# Patient Record
Sex: MALE | Race: WHITE | NOT HISPANIC OR LATINO | Employment: FULL TIME | URBAN - METROPOLITAN AREA
[De-identification: names, ages, dates, MRNs, and addresses within clinical notes are randomized per-mention and may not be internally consistent; named-entity substitution may affect disease eponyms.]

---

## 2018-01-04 ENCOUNTER — OFFICE VISIT - HEALTHEAST (OUTPATIENT)
Dept: FAMILY MEDICINE | Facility: CLINIC | Age: 23
End: 2018-01-04

## 2018-01-04 DIAGNOSIS — I86.1 BILATERAL VARICOCELES: ICD-10-CM

## 2018-01-04 ASSESSMENT — MIFFLIN-ST. JEOR: SCORE: 2467.71

## 2018-01-05 ENCOUNTER — COMMUNICATION - HEALTHEAST (OUTPATIENT)
Dept: FAMILY MEDICINE | Facility: CLINIC | Age: 23
End: 2018-01-05

## 2018-01-17 ENCOUNTER — RECORDS - HEALTHEAST (OUTPATIENT)
Dept: ADMINISTRATIVE | Facility: OTHER | Age: 23
End: 2018-01-17

## 2018-02-05 ENCOUNTER — RECORDS - HEALTHEAST (OUTPATIENT)
Dept: ADMINISTRATIVE | Facility: OTHER | Age: 23
End: 2018-02-05

## 2018-03-07 ENCOUNTER — COMMUNICATION - HEALTHEAST (OUTPATIENT)
Dept: FAMILY MEDICINE | Facility: CLINIC | Age: 23
End: 2018-03-07

## 2018-03-09 ENCOUNTER — OFFICE VISIT - HEALTHEAST (OUTPATIENT)
Dept: FAMILY MEDICINE | Facility: CLINIC | Age: 23
End: 2018-03-09

## 2018-03-09 DIAGNOSIS — Z00.00 HEALTHCARE MAINTENANCE: ICD-10-CM

## 2018-03-09 DIAGNOSIS — Z72.0 TOBACCO ABUSE: ICD-10-CM

## 2018-03-09 DIAGNOSIS — I86.1 LEFT VARICOCELE: ICD-10-CM

## 2018-03-09 DIAGNOSIS — I49.9 IRREGULAR HEART BEAT: ICD-10-CM

## 2018-03-09 DIAGNOSIS — M41.129 ADOLESCENT IDIOPATHIC SCOLIOSIS, UNSPECIFIED SPINAL REGION: ICD-10-CM

## 2018-03-09 DIAGNOSIS — Z71.6 TOBACCO ABUSE COUNSELING: ICD-10-CM

## 2018-03-09 DIAGNOSIS — Z86.0100 HX OF COLONIC POLYPS: ICD-10-CM

## 2018-03-09 LAB
ALBUMIN SERPL-MCNC: 4.3 G/DL (ref 3.5–5)
ALP SERPL-CCNC: 98 U/L (ref 45–120)
ALT SERPL W P-5'-P-CCNC: 81 U/L (ref 0–45)
ANION GAP SERPL CALCULATED.3IONS-SCNC: 11 MMOL/L (ref 5–18)
AST SERPL W P-5'-P-CCNC: 29 U/L (ref 0–40)
BILIRUB SERPL-MCNC: 0.5 MG/DL (ref 0–1)
BUN SERPL-MCNC: 17 MG/DL (ref 8–22)
CALCIUM SERPL-MCNC: 10.1 MG/DL (ref 8.5–10.5)
CHLORIDE BLD-SCNC: 106 MMOL/L (ref 98–107)
CHOLEST SERPL-MCNC: 215 MG/DL
CO2 SERPL-SCNC: 27 MMOL/L (ref 22–31)
CREAT SERPL-MCNC: 0.88 MG/DL (ref 0.7–1.3)
FASTING STATUS PATIENT QL REPORTED: NO
GFR SERPL CREATININE-BSD FRML MDRD: >60 ML/MIN/1.73M2
GLUCOSE BLD-MCNC: 78 MG/DL (ref 70–125)
HDLC SERPL-MCNC: 42 MG/DL
LDLC SERPL CALC-MCNC: 133 MG/DL
POTASSIUM BLD-SCNC: 4.5 MMOL/L (ref 3.5–5)
PROT SERPL-MCNC: 7.8 G/DL (ref 6–8)
SODIUM SERPL-SCNC: 144 MMOL/L (ref 136–145)
TRIGL SERPL-MCNC: 199 MG/DL

## 2018-03-09 ASSESSMENT — MIFFLIN-ST. JEOR: SCORE: 2495.49

## 2018-03-11 ENCOUNTER — COMMUNICATION - HEALTHEAST (OUTPATIENT)
Dept: FAMILY MEDICINE | Facility: CLINIC | Age: 23
End: 2018-03-11

## 2018-03-22 ENCOUNTER — COMMUNICATION - HEALTHEAST (OUTPATIENT)
Dept: FAMILY MEDICINE | Facility: CLINIC | Age: 23
End: 2018-03-22

## 2018-05-14 ENCOUNTER — OFFICE VISIT - HEALTHEAST (OUTPATIENT)
Dept: FAMILY MEDICINE | Facility: CLINIC | Age: 23
End: 2018-05-14

## 2018-05-14 DIAGNOSIS — J06.9 URI (UPPER RESPIRATORY INFECTION): ICD-10-CM

## 2018-05-14 DIAGNOSIS — A08.4 VIRAL GASTROENTERITIS: ICD-10-CM

## 2018-05-14 ASSESSMENT — MIFFLIN-ST. JEOR: SCORE: 2527.24

## 2018-05-16 ENCOUNTER — COMMUNICATION - HEALTHEAST (OUTPATIENT)
Dept: FAMILY MEDICINE | Facility: CLINIC | Age: 23
End: 2018-05-16

## 2018-08-24 ENCOUNTER — AMBULATORY - HEALTHEAST (OUTPATIENT)
Dept: NURSING | Facility: CLINIC | Age: 23
End: 2018-08-24

## 2018-08-24 ENCOUNTER — COMMUNICATION - HEALTHEAST (OUTPATIENT)
Dept: FAMILY MEDICINE | Facility: CLINIC | Age: 23
End: 2018-08-24

## 2018-08-24 DIAGNOSIS — Z00.00 ROUTINE GENERAL MEDICAL EXAMINATION AT A HEALTH CARE FACILITY: ICD-10-CM

## 2018-09-10 ENCOUNTER — COMMUNICATION - HEALTHEAST (OUTPATIENT)
Dept: FAMILY MEDICINE | Facility: CLINIC | Age: 23
End: 2018-09-10

## 2018-09-20 ENCOUNTER — AMBULATORY - HEALTHEAST (OUTPATIENT)
Dept: NURSING | Facility: CLINIC | Age: 23
End: 2018-09-20

## 2018-09-20 ENCOUNTER — AMBULATORY - HEALTHEAST (OUTPATIENT)
Dept: FAMILY MEDICINE | Facility: CLINIC | Age: 23
End: 2018-09-20

## 2018-09-20 DIAGNOSIS — Z00.00 HEALTHCARE MAINTENANCE: ICD-10-CM

## 2018-11-08 ENCOUNTER — OFFICE VISIT - HEALTHEAST (OUTPATIENT)
Dept: FAMILY MEDICINE | Facility: CLINIC | Age: 23
End: 2018-11-08

## 2018-11-08 DIAGNOSIS — R03.0 ELEVATED BP WITHOUT DIAGNOSIS OF HYPERTENSION: ICD-10-CM

## 2018-11-08 DIAGNOSIS — Z78.9 USE OF ENERGY DRINKS: ICD-10-CM

## 2018-11-08 LAB
ALBUMIN SERPL-MCNC: 4.1 G/DL (ref 3.5–5)
ALP SERPL-CCNC: 94 U/L (ref 45–120)
ALT SERPL W P-5'-P-CCNC: 52 U/L (ref 0–45)
ANION GAP SERPL CALCULATED.3IONS-SCNC: 12 MMOL/L (ref 5–18)
AST SERPL W P-5'-P-CCNC: 27 U/L (ref 0–40)
ATRIAL RATE - MUSE: 89 BPM
BILIRUB SERPL-MCNC: 0.3 MG/DL (ref 0–1)
BUN SERPL-MCNC: 12 MG/DL (ref 8–22)
CALCIUM SERPL-MCNC: 10.1 MG/DL (ref 8.5–10.5)
CHLORIDE BLD-SCNC: 107 MMOL/L (ref 98–107)
CO2 SERPL-SCNC: 24 MMOL/L (ref 22–31)
CREAT SERPL-MCNC: 0.85 MG/DL (ref 0.7–1.3)
DIASTOLIC BLOOD PRESSURE - MUSE: NORMAL MMHG
GFR SERPL CREATININE-BSD FRML MDRD: >60 ML/MIN/1.73M2
GLUCOSE BLD-MCNC: 77 MG/DL (ref 70–125)
HBA1C MFR BLD: 5.3 % (ref 3.5–6)
INTERPRETATION ECG - MUSE: NORMAL
P AXIS - MUSE: 14 DEGREES
POTASSIUM BLD-SCNC: 4.3 MMOL/L (ref 3.5–5)
PR INTERVAL - MUSE: 138 MS
PROT SERPL-MCNC: 7.3 G/DL (ref 6–8)
QRS DURATION - MUSE: 108 MS
QT - MUSE: 364 MS
QTC - MUSE: 442 MS
R AXIS - MUSE: 43 DEGREES
SODIUM SERPL-SCNC: 143 MMOL/L (ref 136–145)
SYSTOLIC BLOOD PRESSURE - MUSE: NORMAL MMHG
T AXIS - MUSE: 35 DEGREES
VENTRICULAR RATE- MUSE: 89 BPM

## 2019-01-07 ENCOUNTER — OFFICE VISIT - HEALTHEAST (OUTPATIENT)
Dept: FAMILY MEDICINE | Facility: CLINIC | Age: 24
End: 2019-01-07

## 2019-01-07 ENCOUNTER — COMMUNICATION - HEALTHEAST (OUTPATIENT)
Dept: FAMILY MEDICINE | Facility: CLINIC | Age: 24
End: 2019-01-07

## 2019-01-07 DIAGNOSIS — A08.4 VIRAL GASTROENTERITIS: ICD-10-CM

## 2019-01-07 DIAGNOSIS — Z00.00 HEALTHCARE MAINTENANCE: ICD-10-CM

## 2019-01-07 ASSESSMENT — MIFFLIN-ST. JEOR: SCORE: 2644.61

## 2019-01-08 ENCOUNTER — COMMUNICATION - HEALTHEAST (OUTPATIENT)
Dept: FAMILY MEDICINE | Facility: CLINIC | Age: 24
End: 2019-01-08

## 2019-01-09 ENCOUNTER — COMMUNICATION - HEALTHEAST (OUTPATIENT)
Dept: FAMILY MEDICINE | Facility: CLINIC | Age: 24
End: 2019-01-09

## 2019-01-21 ENCOUNTER — OFFICE VISIT - HEALTHEAST (OUTPATIENT)
Dept: FAMILY MEDICINE | Facility: CLINIC | Age: 24
End: 2019-01-21

## 2019-01-21 DIAGNOSIS — J02.9 EXUDATIVE PHARYNGITIS: ICD-10-CM

## 2019-01-21 DIAGNOSIS — R07.0 THROAT PAIN: ICD-10-CM

## 2019-01-21 LAB — DEPRECATED S PYO AG THROAT QL EIA: NORMAL

## 2019-01-22 LAB — GROUP A STREP BY PCR: NORMAL

## 2019-01-23 ENCOUNTER — COMMUNICATION - HEALTHEAST (OUTPATIENT)
Dept: FAMILY MEDICINE | Facility: CLINIC | Age: 24
End: 2019-01-23

## 2019-03-01 ENCOUNTER — COMMUNICATION - HEALTHEAST (OUTPATIENT)
Dept: FAMILY MEDICINE | Facility: CLINIC | Age: 24
End: 2019-03-01

## 2019-03-06 ENCOUNTER — COMMUNICATION - HEALTHEAST (OUTPATIENT)
Dept: FAMILY MEDICINE | Facility: CLINIC | Age: 24
End: 2019-03-06

## 2019-03-07 ENCOUNTER — AMBULATORY - HEALTHEAST (OUTPATIENT)
Dept: FAMILY MEDICINE | Facility: CLINIC | Age: 24
End: 2019-03-07

## 2019-03-07 DIAGNOSIS — Z71.6 TOBACCO ABUSE COUNSELING: ICD-10-CM

## 2019-03-21 ENCOUNTER — RECORDS - HEALTHEAST (OUTPATIENT)
Dept: ADMINISTRATIVE | Facility: OTHER | Age: 24
End: 2019-03-21

## 2019-04-25 ENCOUNTER — OFFICE VISIT - HEALTHEAST (OUTPATIENT)
Dept: FAMILY MEDICINE | Facility: CLINIC | Age: 24
End: 2019-04-25

## 2019-04-25 ENCOUNTER — COMMUNICATION - HEALTHEAST (OUTPATIENT)
Dept: FAMILY MEDICINE | Facility: CLINIC | Age: 24
End: 2019-04-25

## 2019-04-25 DIAGNOSIS — B37.2 CANDIDAL SKIN INFECTION: ICD-10-CM

## 2019-07-08 ENCOUNTER — COMMUNICATION - HEALTHEAST (OUTPATIENT)
Dept: FAMILY MEDICINE | Facility: CLINIC | Age: 24
End: 2019-07-08

## 2019-08-27 ENCOUNTER — COMMUNICATION - HEALTHEAST (OUTPATIENT)
Dept: FAMILY MEDICINE | Facility: CLINIC | Age: 24
End: 2019-08-27

## 2019-09-05 ENCOUNTER — AMBULATORY - HEALTHEAST (OUTPATIENT)
Dept: NURSING | Facility: CLINIC | Age: 24
End: 2019-09-05

## 2019-09-05 DIAGNOSIS — Z00.00 HEALTHCARE MAINTENANCE: ICD-10-CM

## 2019-10-11 ENCOUNTER — COMMUNICATION - HEALTHEAST (OUTPATIENT)
Dept: FAMILY MEDICINE | Facility: CLINIC | Age: 24
End: 2019-10-11

## 2019-10-11 DIAGNOSIS — Z86.0100 HX OF COLONIC POLYPS: ICD-10-CM

## 2019-10-16 ENCOUNTER — COMMUNICATION - HEALTHEAST (OUTPATIENT)
Dept: FAMILY MEDICINE | Facility: CLINIC | Age: 24
End: 2019-10-16

## 2019-12-20 ENCOUNTER — RECORDS - HEALTHEAST (OUTPATIENT)
Dept: ADMINISTRATIVE | Facility: OTHER | Age: 24
End: 2019-12-20

## 2020-01-02 ENCOUNTER — RECORDS - HEALTHEAST (OUTPATIENT)
Dept: ADMINISTRATIVE | Facility: OTHER | Age: 25
End: 2020-01-02

## 2020-02-13 ENCOUNTER — COMMUNICATION - HEALTHEAST (OUTPATIENT)
Dept: FAMILY MEDICINE | Facility: CLINIC | Age: 25
End: 2020-02-13

## 2020-02-21 ENCOUNTER — OFFICE VISIT - HEALTHEAST (OUTPATIENT)
Dept: FAMILY MEDICINE | Facility: CLINIC | Age: 25
End: 2020-02-21

## 2020-02-21 DIAGNOSIS — M41.129 ADOLESCENT IDIOPATHIC SCOLIOSIS, UNSPECIFIED SPINAL REGION: ICD-10-CM

## 2020-02-21 DIAGNOSIS — G89.29 CHRONIC LOW BACK PAIN WITH SCIATICA, SCIATICA LATERALITY UNSPECIFIED, UNSPECIFIED BACK PAIN LATERALITY: ICD-10-CM

## 2020-02-21 DIAGNOSIS — M54.40 CHRONIC LOW BACK PAIN WITH SCIATICA, SCIATICA LATERALITY UNSPECIFIED, UNSPECIFIED BACK PAIN LATERALITY: ICD-10-CM

## 2020-02-21 RX ORDER — IBUPROFEN 200 MG
200 TABLET ORAL
Status: SHIPPED | COMMUNITY
Start: 2012-06-21 | End: 2023-06-23

## 2020-02-25 ENCOUNTER — OFFICE VISIT - HEALTHEAST (OUTPATIENT)
Dept: FAMILY MEDICINE | Facility: CLINIC | Age: 25
End: 2020-02-25

## 2020-02-25 ENCOUNTER — AMBULATORY - HEALTHEAST (OUTPATIENT)
Dept: FAMILY MEDICINE | Facility: CLINIC | Age: 25
End: 2020-02-25

## 2020-02-25 ENCOUNTER — COMMUNICATION - HEALTHEAST (OUTPATIENT)
Dept: FAMILY MEDICINE | Facility: CLINIC | Age: 25
End: 2020-02-25

## 2020-02-25 DIAGNOSIS — R07.9 ACUTE CHEST PAIN: ICD-10-CM

## 2020-02-25 DIAGNOSIS — M54.2 NECK PAIN: ICD-10-CM
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
## 2020-02-25 LAB
ATRIAL RATE - MUSE: 88 BPM
DIASTOLIC BLOOD PRESSURE - MUSE: NORMAL
INTERPRETATION ECG - MUSE: NORMAL
P AXIS - MUSE: 49 DEGREES
PR INTERVAL - MUSE: 126 MS
QRS DURATION - MUSE: 108 MS
QT - MUSE: 362 MS
QTC - MUSE: 438 MS
R AXIS - MUSE: 37 DEGREES
SYSTOLIC BLOOD PRESSURE - MUSE: NORMAL
T AXIS - MUSE: 29 DEGREES
VENTRICULAR RATE- MUSE: 88 BPM

## 2020-03-11 ENCOUNTER — COMMUNICATION - HEALTHEAST (OUTPATIENT)
Dept: FAMILY MEDICINE | Facility: CLINIC | Age: 25
End: 2020-03-11

## 2020-03-12 ENCOUNTER — OFFICE VISIT - HEALTHEAST (OUTPATIENT)
Dept: FAMILY MEDICINE | Facility: CLINIC | Age: 25
End: 2020-03-12

## 2020-03-12 ENCOUNTER — RECORDS - HEALTHEAST (OUTPATIENT)
Dept: GENERAL RADIOLOGY | Facility: CLINIC | Age: 25
End: 2020-03-12

## 2020-03-12 DIAGNOSIS — M79.602 PAIN IN LEFT ARM: ICD-10-CM

## 2020-03-12 DIAGNOSIS — M41.129 ADOLESCENT IDIOPATHIC SCOLIOSIS, UNSPECIFIED SPINAL REGION: ICD-10-CM

## 2020-03-12 DIAGNOSIS — M54.2 NECK PAIN: ICD-10-CM

## 2020-03-12 DIAGNOSIS — M79.601 PARESTHESIA AND PAIN OF BOTH UPPER EXTREMITIES: ICD-10-CM

## 2020-03-12 DIAGNOSIS — R20.2 PARESTHESIA OF SKIN: ICD-10-CM

## 2020-03-12 DIAGNOSIS — R20.2 PARESTHESIA AND PAIN OF BOTH UPPER EXTREMITIES: ICD-10-CM

## 2020-03-12 DIAGNOSIS — M79.602 PARESTHESIA AND PAIN OF BOTH UPPER EXTREMITIES: ICD-10-CM

## 2020-03-12 DIAGNOSIS — R29.898 OTHER SYMPTOMS AND SIGNS INVOLVING THE MUSCULOSKELETAL SYSTEM: ICD-10-CM

## 2020-03-12 DIAGNOSIS — M79.601 PAIN IN RIGHT ARM: ICD-10-CM

## 2020-03-12 DIAGNOSIS — R29.898 UPPER EXTREMITY WEAKNESS: ICD-10-CM

## 2020-03-12 DIAGNOSIS — M54.2 CERVICALGIA: ICD-10-CM

## 2020-03-20 ENCOUNTER — HOSPITAL ENCOUNTER (OUTPATIENT)
Dept: MRI IMAGING | Facility: HOSPITAL | Age: 25
Discharge: HOME | End: 2020-03-20
Attending: FAMILY MEDICINE

## 2020-03-20 DIAGNOSIS — M79.602 PARESTHESIA AND PAIN OF BOTH UPPER EXTREMITIES: ICD-10-CM

## 2020-03-20 DIAGNOSIS — M79.601 PARESTHESIA AND PAIN OF BOTH UPPER EXTREMITIES: ICD-10-CM

## 2020-03-20 DIAGNOSIS — M54.2 NECK PAIN: ICD-10-CM

## 2020-03-20 DIAGNOSIS — R29.898 UPPER EXTREMITY WEAKNESS: ICD-10-CM

## 2020-03-20 DIAGNOSIS — R20.2 PARESTHESIA AND PAIN OF BOTH UPPER EXTREMITIES: ICD-10-CM

## 2020-04-04 ENCOUNTER — COMMUNICATION - HEALTHEAST (OUTPATIENT)
Dept: FAMILY MEDICINE | Facility: CLINIC | Age: 25
End: 2020-04-04

## 2020-04-06 ENCOUNTER — AMBULATORY - HEALTHEAST (OUTPATIENT)
Dept: FAMILY MEDICINE | Facility: CLINIC | Age: 25
End: 2020-04-06

## 2020-04-06 DIAGNOSIS — L73.1 INGROWN HAIR: ICD-10-CM

## 2020-04-20 ENCOUNTER — VIRTUAL VISIT (OUTPATIENT)
Dept: FAMILY MEDICINE | Facility: OTHER | Age: 25
End: 2020-04-20

## 2020-04-20 ENCOUNTER — COMMUNICATION - HEALTHEAST (OUTPATIENT)
Dept: FAMILY MEDICINE | Facility: CLINIC | Age: 25
End: 2020-04-20

## 2020-04-20 NOTE — PROGRESS NOTES
"Date: 2020 10:36:08  Clinician: Stanley Saldana  Clinician NPI: [id number]  Patient: Tyler lilly  Patient : 1995  Patient Address: [address] [place], [place] [address]  Patient Phone: [phone]  Visit Protocol: URI  Patient Summary:  Tyler is a 25 year old ( : 1995 ) male who initiated a Visit for cold, sinus infection, or influenza. When asked the question \"Please sign me up to receive news, health information and promotions from [employer].\", Tyler responded \"No\".    Tyler states his symptoms started today.   His symptoms consist of rhinitis, enlarged lymph nodes, chills, diarrhea, a cough, nasal congestion, malaise, myalgia, and a headache. Tyler also feels feverish but was unable to measure his temperature.   Symptom details     Nasal secretions: The color of his mucus is white and yellow.    Cough: Tyler coughs a few times an hour and his cough is not more bothersome at night. Phlegm comes into his throat when he coughs. He does not believe his cough is caused by post-nasal drip. The color of the phlegm is clear, white, and yellow.     Headache: He states the headache is moderate (4-6 on a 10 point pain scale).      Tyler denies having facial pain or pressure, sore throat, ear pain, vomiting, nausea, teeth pain, wheezing, anosmia, and ageusia. He also denies taking antibiotic medication for the symptoms, having a sinus infection within the past year, and having recent facial or sinus surgery in the past 60 days. He is not experiencing dyspnea.   Precipitating events  He has not recently been exposed to someone with influenza. Tyler has been in close contact with the following high risk individuals: children under the age of 5.   Pertinent COVID-19 (Coronavirus) information  Tyler has not traveled internationally or to the areas where COVID-19 (Coronavirus) is widespread, including cruise ship travel in the last 14 days before the start of his symptoms.   Tyler does not work or " volunteer as healthcare worker or a  and does not work or volunteer in a healthcare facility.   He does not live with a healthcare worker.   Tyler has not had a close contact with a laboratory-confirmed COVID-19 patient within 14 days of symptom onset. He also has not had a close contact with a suspected COVID-19 patient within 14 days of symptom onset.   Pertinent medical history  Tyler needs a return to work/school note.   Weight: 330 lbs   Tyler smokes or uses smokeless tobacco.   Weight: 330 lbs    MEDICATIONS: No current medications, ALLERGIES: NKDA  Clinician Response:  Dear Tyler,   Dear Tyler  Your symptoms show that you may have coronavirus (COVID-19) although the likelihood is low given your risk factors and if you have been practicing strict social distancing. This illness can cause fever, cough and trouble breathing. Many people get a mild case and get better on their own. Some people can get very sick.  Will I be tested for COVID-19?  Because the virus is spreading, we are no longer testing most patients. You may request testing if:   You are very ill. For example, you're on chemotherapy, dialysis or home hospice care. (Contact your specialty clinic or program.)   You live in a nursing home or other long-term care facility. (Talk to your nurse manager or medical director.)   You're a health care worker. (Contact your employee health office.)   How can I protect others?  Without a test, we can't know for sure that you have COVID-19. For safety, it's very important to follow these rules.  First, stay home and away from others (self-isolate) until:   You've had no fever---and no medicine that reduces fever---for 3 full days (72 hours). And...    Your other symptoms have gotten better. For example, your cough or breathing has improved. And...   At least 7 days have passed since your symptoms started.   During this time:   Don't go to work, school or anywhere else.    Stay away from  others in your home. No hugging, kissing or shaking hands.   Don't let anyone visit.   Cover your mouth and nose with a mask, tissue or wash cloth to avoid spreading germs.   Wash your hands and face often. Use soap and water.   How can I take care of myself? (please have someone else go to the store for you to  the medications)   1.Take Tylenol (acetaminophen) for fever or pain. If you have liver or kidney problems, ask your family doctor if it's okay to take Tylenol.&nbsp;  fluticasone nasal generic steroid spray once a day for next 7-10 days. use in each nostril daily, symptoms should improve with ongoing use  over the counter cough remedies such as delsym/robitussin       Adults can take either:    650 mg (two 325 mg pills) every 4 to 6 hours, or...   1,000 mg (two 500 mg pills) every 8 hours as needed.    Note: Don't take more than 3,000 mg in one day.   For children, check the Tylenol bottle for the right dose. The dose is based on the child's age or weight.   2.If you have other health problems (like cancer, heart failure, an organ transplant or severe kidney disease): Call your specialty clinic if you don't feel better in the next 2 days.       3.Know when to call 911: If your breathing is so bad that it keeps you from doing normal activities, call 911 or go to the emergency room. Tell them that you've been staying home and may have COVID-19.       4.Sign up for [employer]. We know it's scary to hear that you might have COVID-19. We want to track your symptoms to make sure you're okay over the next 2 weeks. Please look for an email from [employer]---this is a free, online program that we'll use to keep in touch. To sign up, follow the link in the email. Learn more at [email or web address].   Where can I get more information?  To learn more about COVID-19 and how to care for yourself at home, please visit the CDC website at  https://www.cdc.gov/coronavirus/2019-ncov/about/steps-when-sick.html.  For more options for care at [facility], please visit our website at [email or web address]     Diagnosis: Cough  Diagnosis ICD: R05

## 2020-06-09 ENCOUNTER — COMMUNICATION - HEALTHEAST (OUTPATIENT)
Dept: FAMILY MEDICINE | Facility: CLINIC | Age: 25
End: 2020-06-09

## 2020-06-10 ENCOUNTER — AMBULATORY - HEALTHEAST (OUTPATIENT)
Dept: FAMILY MEDICINE | Facility: CLINIC | Age: 25
End: 2020-06-10

## 2020-06-10 ENCOUNTER — OFFICE VISIT - HEALTHEAST (OUTPATIENT)
Dept: FAMILY MEDICINE | Facility: CLINIC | Age: 25
End: 2020-06-10

## 2020-06-10 ENCOUNTER — COMMUNICATION - HEALTHEAST (OUTPATIENT)
Dept: FAMILY MEDICINE | Facility: CLINIC | Age: 25
End: 2020-06-10

## 2020-06-10 DIAGNOSIS — M25.512 ACUTE PAIN OF LEFT SHOULDER: ICD-10-CM

## 2020-06-10 DIAGNOSIS — M54.2 NECK PAIN: ICD-10-CM

## 2020-06-10 DIAGNOSIS — K60.2 ANAL FISSURE: ICD-10-CM

## 2020-06-25 ENCOUNTER — COMMUNICATION - HEALTHEAST (OUTPATIENT)
Dept: FAMILY MEDICINE | Facility: CLINIC | Age: 25
End: 2020-06-25

## 2020-06-25 DIAGNOSIS — K60.2 ANAL FISSURE: ICD-10-CM

## 2020-06-26 ENCOUNTER — RECORDS - HEALTHEAST (OUTPATIENT)
Dept: ADMINISTRATIVE | Facility: OTHER | Age: 25
End: 2020-06-26

## 2020-07-07 ENCOUNTER — HOSPITAL ENCOUNTER (OUTPATIENT)
Dept: MRI IMAGING | Facility: CLINIC | Age: 25
Discharge: HOME | End: 2020-07-07
Attending: FAMILY MEDICINE

## 2020-07-07 DIAGNOSIS — M54.2 NECK PAIN: ICD-10-CM

## 2020-08-28 ENCOUNTER — COMMUNICATION - HEALTHEAST (OUTPATIENT)
Dept: FAMILY MEDICINE | Facility: CLINIC | Age: 25
End: 2020-08-28

## 2020-08-28 DIAGNOSIS — M54.40 CHRONIC LOW BACK PAIN WITH SCIATICA, SCIATICA LATERALITY UNSPECIFIED, UNSPECIFIED BACK PAIN LATERALITY: ICD-10-CM

## 2020-08-28 DIAGNOSIS — G89.29 CHRONIC LOW BACK PAIN WITH SCIATICA, SCIATICA LATERALITY UNSPECIFIED, UNSPECIFIED BACK PAIN LATERALITY: ICD-10-CM

## 2020-08-28 DIAGNOSIS — M41.129 ADOLESCENT IDIOPATHIC SCOLIOSIS, UNSPECIFIED SPINAL REGION: ICD-10-CM

## 2020-09-29 ENCOUNTER — AMBULATORY - HEALTHEAST (OUTPATIENT)
Dept: NURSING | Facility: CLINIC | Age: 25
End: 2020-09-29

## 2020-09-29 DIAGNOSIS — Z00.00 HEALTHCARE MAINTENANCE: ICD-10-CM

## 2020-10-10 ENCOUNTER — COMMUNICATION - HEALTHEAST (OUTPATIENT)
Dept: FAMILY MEDICINE | Facility: CLINIC | Age: 25
End: 2020-10-10

## 2020-10-22 ENCOUNTER — COMMUNICATION - HEALTHEAST (OUTPATIENT)
Dept: FAMILY MEDICINE | Facility: CLINIC | Age: 25
End: 2020-10-22

## 2020-12-18 ENCOUNTER — COMMUNICATION - HEALTHEAST (OUTPATIENT)
Dept: FAMILY MEDICINE | Facility: CLINIC | Age: 25
End: 2020-12-18

## 2021-01-20 ENCOUNTER — COMMUNICATION - HEALTHEAST (OUTPATIENT)
Dept: FAMILY MEDICINE | Facility: CLINIC | Age: 26
End: 2021-01-20

## 2021-01-26 ENCOUNTER — OFFICE VISIT - HEALTHEAST (OUTPATIENT)
Dept: FAMILY MEDICINE | Facility: CLINIC | Age: 26
End: 2021-01-26

## 2021-01-26 DIAGNOSIS — M25.512 CHRONIC LEFT SHOULDER PAIN: ICD-10-CM

## 2021-01-26 DIAGNOSIS — G89.29 CHRONIC LEFT SHOULDER PAIN: ICD-10-CM

## 2021-01-26 DIAGNOSIS — F52.4 PREMATURE EJACULATION: ICD-10-CM

## 2021-01-26 DIAGNOSIS — E66.01 MORBID OBESITY (H): ICD-10-CM

## 2021-02-09 ENCOUNTER — COMMUNICATION - HEALTHEAST (OUTPATIENT)
Dept: FAMILY MEDICINE | Facility: CLINIC | Age: 26
End: 2021-02-09

## 2021-02-16 ENCOUNTER — OFFICE VISIT - HEALTHEAST (OUTPATIENT)
Dept: FAMILY MEDICINE | Facility: CLINIC | Age: 26
End: 2021-02-16

## 2021-02-16 DIAGNOSIS — R05.9 COUGH: ICD-10-CM

## 2021-02-16 DIAGNOSIS — Z87.891 QUIT SMOKING WITHIN PAST YEAR: ICD-10-CM

## 2021-02-16 DIAGNOSIS — A49.01 STAPH AUREUS INFECTION: ICD-10-CM

## 2021-03-05 ENCOUNTER — COMMUNICATION - HEALTHEAST (OUTPATIENT)
Dept: FAMILY MEDICINE | Facility: CLINIC | Age: 26
End: 2021-03-05

## 2021-03-05 ENCOUNTER — AMBULATORY - HEALTHEAST (OUTPATIENT)
Dept: FAMILY MEDICINE | Facility: CLINIC | Age: 26
End: 2021-03-05

## 2021-03-05 DIAGNOSIS — L08.9 INFECTION, SKIN: ICD-10-CM

## 2021-05-10 ENCOUNTER — OFFICE VISIT - HEALTHEAST (OUTPATIENT)
Dept: FAMILY MEDICINE | Facility: CLINIC | Age: 26
End: 2021-05-10

## 2021-05-10 DIAGNOSIS — G89.29 CHRONIC LEFT SHOULDER PAIN: ICD-10-CM

## 2021-05-10 DIAGNOSIS — M25.512 CHRONIC LEFT SHOULDER PAIN: ICD-10-CM

## 2021-05-13 ENCOUNTER — RECORDS - HEALTHEAST (OUTPATIENT)
Dept: ADMINISTRATIVE | Facility: OTHER | Age: 26
End: 2021-05-13

## 2021-05-26 VITALS — SYSTOLIC BLOOD PRESSURE: 122 MMHG | DIASTOLIC BLOOD PRESSURE: 80 MMHG

## 2021-05-27 VITALS
DIASTOLIC BLOOD PRESSURE: 80 MMHG | WEIGHT: 315 LBS | HEART RATE: 88 BPM | OXYGEN SATURATION: 95 % | SYSTOLIC BLOOD PRESSURE: 110 MMHG

## 2021-05-28 NOTE — TELEPHONE ENCOUNTER
Red and irritated and itchy in the groin area.    Wife recently had a yeast infection    No open sores    No pain or burning with urination    Not painful to the touch    Home treatment recommended and advised an appointment if symptoms do not improve.    Consuelo Sheikh RN  [facility]ication Refill and Triage Nurse  4/25/2019  8:09 AM      Reason for Disposition    Mild Jock Itch in a male    Protocols used: JOCK ITCH-A-[place]

## 2021-05-28 NOTE — PROGRESS NOTES
Assessment:   The encounter diagnosis was Candidal skin infection.   Symptoms suspicious for yeast infection given wife's recent diagnosis and couple's sexual activity. Recommend treatment with topical miconazole.   Come to clinic for evaluation if symptoms do not resolve.     Plan:     Medications Ordered   Medications     miconazole (MICATIN) 2 % cream     Sig: Apply topically 2 (two) times a day. To rash on penis for 14 days or until rash resolves.     Dispense:  42.5 g     Refill:  0     Return for further follow up if needed. Call [phone]) or schedule an appointment via [employer]..    Indu Oleary MD  [facility]    Subjective:   Tyler Hughes is a 24 y.o. male who submitted an eVisit request for evaluation of redness and itchiness of penis - wife currently has vaginal yeast infection and he thinks he got it from sexual activity.    See the questionnaire and message section of encounter report for information related to history of present illness and review of systems.    The following portions of the patient's history were reviewed and updated as appropriate:  Patient Active Problem List   Diagnosis     Attention Deficit Disorder Without Hyperactivity     Obesity     Hx of colonic polyps     Left varicocele     Adolescent idiopathic scoliosis     He has No Known Allergies..     Objective:   No exam performed today, patient submitted as eVisit.

## 2021-05-31 VITALS — HEIGHT: 73 IN | BODY MASS INDEX: 41.75 KG/M2 | WEIGHT: 315 LBS

## 2021-06-01 ENCOUNTER — RECORDS - HEALTHEAST (OUTPATIENT)
Dept: ADMINISTRATIVE | Facility: OTHER | Age: 26
End: 2021-06-01

## 2021-06-01 VITALS — BODY MASS INDEX: 39.17 KG/M2 | HEIGHT: 75 IN | WEIGHT: 315 LBS

## 2021-06-02 VITALS — BODY MASS INDEX: 43.62 KG/M2 | WEIGHT: 315 LBS

## 2021-06-02 VITALS — WEIGHT: 315 LBS | HEIGHT: 75 IN | BODY MASS INDEX: 39.17 KG/M2

## 2021-06-02 NOTE — TELEPHONE ENCOUNTER
Orders being requested: colonoscopy  Reason service is needed/diagnosis: history of polyps  When are orders needed by: non-urgent  Where to send Orders: [facility]  Okay to leave detailed message?  Yes  [phone]    Patient stated Consuelo Samson MD referred him to have this done last year but he didn't get it done. Patient stated [facility] needs an order for the patient to schedule it.

## 2021-06-04 VITALS
RESPIRATION RATE: 21 BRPM | HEART RATE: 96 BPM | OXYGEN SATURATION: 96 % | DIASTOLIC BLOOD PRESSURE: 83 MMHG | SYSTOLIC BLOOD PRESSURE: 136 MMHG | BODY MASS INDEX: 41.75 KG/M2 | WEIGHT: 315 LBS | TEMPERATURE: 98.5 F

## 2021-06-04 VITALS
SYSTOLIC BLOOD PRESSURE: 132 MMHG | BODY MASS INDEX: 40.62 KG/M2 | DIASTOLIC BLOOD PRESSURE: 78 MMHG | HEART RATE: 92 BPM | WEIGHT: 315 LBS | OXYGEN SATURATION: 97 %

## 2021-06-04 VITALS
WEIGHT: 315 LBS | SYSTOLIC BLOOD PRESSURE: 124 MMHG | BODY MASS INDEX: 41.75 KG/M2 | DIASTOLIC BLOOD PRESSURE: 68 MMHG | OXYGEN SATURATION: 95 % | HEART RATE: 85 BPM

## 2021-06-05 VITALS
TEMPERATURE: 98.7 F | OXYGEN SATURATION: 95 % | DIASTOLIC BLOOD PRESSURE: 84 MMHG | HEART RATE: 97 BPM | SYSTOLIC BLOOD PRESSURE: 112 MMHG

## 2021-06-05 VITALS
WEIGHT: 315 LBS | SYSTOLIC BLOOD PRESSURE: 126 MMHG | DIASTOLIC BLOOD PRESSURE: 82 MMHG | BODY MASS INDEX: 44.5 KG/M2 | HEART RATE: 95 BPM | OXYGEN SATURATION: 95 %
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
## 2021-06-06 NOTE — PATIENT INSTRUCTIONS - HE
1. Core exercises daily  2. Body pillow for side sleeping  3. Consider PT  4. Have to cut down on the ibuprofen; up to 800 mg as needed but try for drug holidays  5. Muscle relaxer
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
## 2021-06-09 ENCOUNTER — RECORDS - HEALTHEAST (OUTPATIENT)
Dept: ADMINISTRATIVE | Facility: OTHER | Age: 26
End: 2021-06-09

## 2021-06-14 NOTE — PROGRESS NOTES
Assessment & Plan     Premature ejaculation  Patient is sometimes unable to refrain from ejaculation even when engaging in nonpartnered activities, and this is causing some significant distress to the patient but not to partner.  Patient has considered numbing agents but would be concerned about their effect on his partners experience.  Patient does not like condoms and so is less enthusiastic about the idea of numbing condoms.  We discussed sex therapy and patient was very uninterested in this idea.  With shared decision making chose to try low-dose Paxil.  - PARoxetine (PAXIL) 10 MG tablet  Dispense: 30 tablet; Refill: 2    Chronic left shoulder pain  Pt still in PT, massage therapy, and chiropractor help.   Refilled:  - cyclobenzaprine (FLEXERIL) 10 MG tablet  Dispense: 60 tablet; Refill: 1    Morbid obesity (H)  Could possibly be a factor in patient's premature ejaculation and is likely affecting patient's ongoing back pain, not addressed today.  Patient will attend weight loss eventually.             Tobacco Cessation:   reports that he has been smoking cigars. He has been smoking about 1.00 pack per day. He has quit using smokeless tobacco.    No follow-ups on file.    Consuelo Samson MD  [facility]     Tyler Hughes is 25 y.o. and presents to clinic today for the following health issues   HPI     Patient is suffering from premature ejaculation.  It has been going on for many years.  It does not seem to bother his partner but does bother the patient.  His latency between ability to climax appears to be as little as 30 minutes if he had the inclination.  He has no difficulty achieving an erection and sometimes this concern even manifests with nonpartnered activities.  Patient is not interested in the numbing gels because of a concern for its effect on his partners experience, he does not like condoms and is uninterested in therapy.    Patient also is chronic left  shoulder pain.  He has seen a physical therapist, massage therapist, and a chiropractor.  He would like a refill on his Flexeril.  He tries not to take it every day.  He does supplement with Tylenol and ibuprofen sparingly.    Review of Systems  none      Objective    /82   Pulse 95   Wt (!) 356 lb (161.5 kg)   SpO2 95%   BMI 44.50 kg/m    Body mass index is 44.5 kg/m .  Physical Exam  GEN: No apparent distress  Psych: Affect is congruent, no evidence of hallucinations or delusions

## 2021-06-15 ENCOUNTER — RECORDS - HEALTHEAST (OUTPATIENT)
Dept: ADMINISTRATIVE | Facility: OTHER | Age: 26
End: 2021-06-15

## 2021-06-15 NOTE — PATIENT INSTRUCTIONS - HE
1. Chlorhexidine shower once weekly  2. guaifenesin as needed before bed for cough  3. Picture if you get belly button infection again

## 2021-06-15 NOTE — PROGRESS NOTES
"    Assessment & Plan     Quit smoking within past year  Cough  -I suspect patient's cough is secondary to smoking cessation  -Recommend guaifenesin before bed  -Graduated patient on quitting    Staph aureus infection  -Recommended topical chlorhexidine application once weekly  -Continue to monitor, no indication for oral antibiotics at this time    No evidence of umbilical infection at this time.         Tobacco Cessation:   reports that he has quit smoking. His smoking use included cigars. He started smoking about 2 months ago. He smoked 1.00 pack per day. He has quit using smokeless tobacco.    Return in about 4 weeks (around 3/16/2021) for Annual physical.    Consuelo Samson MD  [facility]   Tyler Hughes is 25 y.o. and presents today for the following health issues   HPI   Possible umbilical infection:  -Present for 2 weeks, accompanied by fever and surrounding erythema, it was leaking pus  -Patient treated it with Epson salts  -It is now completely resolved    Smoking cessation:  - patient quit smoking 1 month ago  -He is now noticing intermittent coughing  -Believes the coughing predated his fever  -He is also coughing up \"ropes of stringy black stuff\"  -He presents a picture of this and it is clear and appears to be similar to a mucous plug    Skin lesions:  -Located across the shoulders predominantly  -Uncertain what I keep appearing        Review of Systems  none      Objective    /84   Pulse 97   Temp 98.7  F (37.1  C)   SpO2 95%   There is no height or weight on file to calculate BMI.  Physical Exam  Gen: NAD  Skin: No obvious evidence of umbilical infection, several unroofed lesions appreciated scattered across patient's shoulders              "

## 2021-06-16 PROBLEM — E66.01 MORBID OBESITY (H): Status: ACTIVE | Noted: 2021-01-26

## 2021-06-16 PROBLEM — M25.512 CHRONIC LEFT SHOULDER PAIN: Status: ACTIVE | Noted: 2021-01-26

## 2021-06-16 PROBLEM — G89.29 CHRONIC LEFT SHOULDER PAIN: Status: ACTIVE | Noted: 2021-01-26

## 2021-06-16 PROBLEM — I86.1 LEFT VARICOCELE: Status: ACTIVE | Noted: 2018-03-09

## 2021-06-16 PROBLEM — Z86.0100 HX OF COLONIC POLYPS: Status: ACTIVE | Noted: 2018-03-09

## 2021-06-16 PROBLEM — M41.129 ADOLESCENT IDIOPATHIC SCOLIOSIS: Status: ACTIVE | Noted: 2018-03-09

## 2021-06-16 NOTE — PROGRESS NOTES
Assessment/Plan:     Patient presents to clinic for establishment of care.    1. Hx of colonic polyps: Patient has a history of bleeding colonic polyps at age 15 which were removed and a colonoscopy, he was supposed to have follow-up at age 13 but never did.  - Ambulatory referral to Gastroenterology for diagnostic colonoscopy    2. Tobacco abuse counseling  Had a discussion that was greater than 15 minutes about smoking cessation and strategies to employ.  Patient is eager to start.  - varenicline (CHANTIX STARTING MONTH BOX) 0.5 mg (11)- 1 mg (42) tablet; 1 wk before you stop smoking take 0.5mg daily on days 1-3, 0.5mg 2 times each day on days 4-7, then 1mg 2 times daily.  Dispense: 53 tablet; Refill: 0  - varenicline (CHANTIX) 1 mg tablet; Take 1 tab by mouth two times a day. Take after eating with a full glass of water. NOTE: Dispense as maintenance for refills only.  Dispense: 60 tablet; Refill: 2    3. Left varicocele  Not examined today, not bothering patient, no changes    4. Adolescent idiopathic scoliosis, unspecified spinal region  Monitored by a chiropractor in [facility], last x-ray was 2 years ago, no current concerns  Had a discussion with patient about weight as the heavier the patient is the worst scoliosis may affect future back pain    5.  Irregular heart rate appreciated on examination which resolves with further examination:  -Unclear etiology, patient denies any shortness of breath or chest pain or pedal edema.  Encourage patient to monitor for symptoms, and will recheck at next appointment.  Do not believe an EKG would demonstrate significant findings as the irregularity has resolved.    Healthcare Maintenance:  - HPV immunization series initiated  - declined influenza vaccine today  USPSTF Recommendations for age 21:  - patient has been screened for intimate partner violence and there are no concerns at this time  - of the recommended screening for chlamydia, gonorrhea, syphilis, HIV,  and hepatitis patient chose no screening  - patient has been counseled on the benefits of a healthful diet, sexual health, and skin cancer prevention      Discontinued Medications:  There are no discontinued medications.    I have had an Advance Directives discussion with the patient.  The following high BMI interventions were performed this visit: weight monitoring and weight loss from baseline weight    Total time spent with patient was 45 minutes with greater than 50% spent in face-to-face counseling regarding the above plan.    This note has been dictated using voice recognition software. Any grammatical or context distortions are unintentional and inherent to the the software.     Consuelo Samson MD  [facility]      Subjective:      Tyler Hughes is a 22 y.o. male who presents to clinic for establishment of care and smoking cessation counseling.    Patient currently smokes approximately 1-1-1/2 packs daily.  He has been smoking for 11 years.  He has attempted to quit seriously only one time and he attempted it by going cold turkey and then patches.  The patches give him a burning sensation and it did not seem to work.  He finds himself feeling anxious when he does not have a cigarette and his wife is pregnant he is eager to quit.  He is interested in Chantix or the breathe program from Dr. Hamilton.    Patient also has a history of scoliosis, café au lait spot on the left forearm that is significantly large but not growing, bleeding colonic polyps and ADHD.    Old records reviewed:   ED record 1/15/18  Previous physical exams 1/22/13 and 7/1/11    Past Medical History, Family History, and Social History reviewed.   History   Smoking Status     Current Every Day Smoker     Packs/day: 1.00   Smokeless Tobacco     Former User       Review of systems is as stated in HPI.  Patient endorses: cough  The remainder of the 10 system review is otherwise negative.    Objective:     /78  Pulse 100   "Ht 6' 2.75\" (1.899 m)  Wt (!) 315 lb (142.9 kg)  SpO2 96%  BMI 39.64 kg/m2 Body mass index is 39.64 kg/(m^2).    Gen: Alert, NAD, appears stated age, normal hygiene   Eyes: conjunctivae without injection, sclera clear, EOMI  ENT/mouth: nares clear, septum midline, absent rhinorrhea, pharyngeal injection absent, neck is supple, no thyroid enlargement  CV: Originally wildly irregular heart rate, when reexamined completely normal heart rate, no murmur appreciated, pedal edema absent bilaterally  Resp: CTAB, no wheezes, rales or ronchi  ABD: normoactive, non-tender to palpation, nondistended  MSK: grossly full range of motion in all joints, no obvious deformity; slight scoliosis located in the lumbar region  Neuro: CN II-XII grossly intact, no deficits in coordination  Psych: no apparent hallucinations or delusions, no pressured speech; alert, oriented x3  SKIN: dry and large cafe au lait spot on right forearm  Heme/lymph: no pallor, no active bleeding/bruising, no adenopathy appreciated      No current outpatient prescriptions on file prior to visit.     No current facility-administered medications on file prior to visit.                "

## 2021-06-17 NOTE — PATIENT INSTRUCTIONS - HE
Patient Instructions by Marcos Montilla PA-C at 1/21/2019  1:30 PM     Author: Marcos Montilla PA-C Service: -- Author Type: Physician Assistant    Filed: 1/21/2019  1:48 PM Encounter Date: 1/21/2019 Status: Signed    : Marcos Montilla PA-C (Physician Assistant)       Suggested increased rest increased fluids and bedside humidification  Over-the-counter Tylenol for comfort.  Follow packaging directions  Over-the-counter throat lozenges with benzocaine such as Cepacol may be used if indicated and is not a choking hazard based on age.  Follow packaging directions.  Do not overuse the benzocaine as it will dry the throat and make it uncomfortable.  Noncontagious after 24 hours on the antibiotic.  Change toothbrush out after 48 hours to avoid reinfecting the mouth.  Follow-up after completion of the antibiotic if any consultation or sequela.      Self-Care for Sore Throats  Sore throats happen for many reasons, such as colds, allergies, and infections caused by viruses or bacteria. In any case, your throat becomes red and sore. Your goal for self-care is to reduce your discomfort while giving your throat a chance to heal.    Moisten and soothe your throat  Tips include the following:    Try a sip of water first thing after waking up.    Keep your throat moist by drinking 6 or more glasses of clear liquids every day.    Run a cool-air humidifier in your room overnight.    Avoid cigarette smoke.     Suck on throat lozenges, cough drops, hard candy, ice chips, or frozen fruit-juice bars. Use the sugar-free versions if your diet or medical condition requires them.  Gargle to ease irritation  Gargling every hour or 2 can ease irritation. Try gargling with 1 of these solutions:    1/4 teaspoon of salt in 1/2 cup of warm water    An over-the-counter anesthetic gargle  Use medicine for more relief  Over-the-counter medicine can reduce sore throat symptoms. Ask your pharmacist if you have questions about which medicine to  use:    Ease pain with anesthetic sprays. Aspirin or an aspirin substitute also helps. Remember, never give aspirin to anyone 18 or younger, or if you are already taking blood thinners.     For sore throats caused by allergies, try antihistamines to block the allergic reaction.    Remember: unless a sore throat is caused by a bacterial infection, antibiotics wont help you.  Prevent future sore throats  Prevention tips include the following:    Stop smoking or reduce contact with secondhand smoke. Smoke irritates the tender throat lining.    Limit contact with pets and with allergy-causing substances, such as pollen and mold.    When youre around someone with a sore throat or cold, wash your hands often to keep viruses or bacteria from spreading.    Dont strain your vocal cords.  Call your healthcare provider  Contact your healthcare provider if you have:    A temperature over 101 F (38.3 C)    White spots on the throat    Great difficulty swallowing    Trouble breathing    A skin rash    Recent exposure to someone else with strep bacteria    Severe hoarseness and swollen glands in the neck or jaw   Date Last Reviewed: 8/1/2016 2000-2016 [employer]. [address] [place] [address], [place] [address]. All rights reserved. This information is not intended as a substitute for professional medical care. Always follow your healthcare professional's instructions.
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
## 2021-06-20 ENCOUNTER — HEALTH MAINTENANCE LETTER (OUTPATIENT)
Age: 26
End: 2021-06-20
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
## 2021-06-25 ENCOUNTER — RECORDS - HEALTHEAST (OUTPATIENT)
Dept: ADMINISTRATIVE | Facility: OTHER | Age: 26
End: 2021-06-25

## 2021-06-26 NOTE — PROGRESS NOTES
"Progress Notes by Marcos Montilla PA-C at 1/4/2018  6:00 PM     Author: Marcos Montilla PA-C Service: -- Author Type: Physician Assistant    Filed: 1/5/2018 11:15 AM Encounter Date: 1/4/2018 Status: Signed    : Marcos Montilla PA-C (Physician Assistant)       Subjective:      Patient ID: Tyler Hughes is a 22 y.o. male.    Chief Complaint:    HPI  Tyler Hguhes is a 22 y.o. male who presents today complaining of a long-standing history of swelling of the left scrotum.  Patient is concerned that he wants to have it evaluated.  At this time he has no hematospermia, hematuria.  He denies a history of trauma and currently has no testicular pain.  No other symptoms of torsion.  He has no nauseousness vomiting.  No fever chills night sweats or skin rash.    No past medical history on file.    No past surgical history on file.    No family history on file.    Social History   Substance Use Topics   ? Smoking status: Current Every Day Smoker   ? Smokeless tobacco: Former User   ? Alcohol use None       Review of Systems  As above in HPI, otherwise negative.    Objective:     /68 (Patient Site: Right Arm, Patient Position: Sitting, Cuff Size: Adult Large)  Pulse 88  Temp 99  F (37.2  C) (Oral)   Resp 18  Ht 6' 1\" (1.854 m)  Wt (!) 315 lb (142.9 kg)  SpO2 98%  BMI 41.56 kg/m2    Physical Exam  General: Patient is resting comfortably no acute distress is afebrile  HEENT: Head is normocephalic atraumatic eyes are PERRLA EOMI sclera anicteric TMs are clear bilaterally  No cervical lymphadenopathy present  Lungs: Clear to auscultation bilaterally  Heart: Regular rate and rhythm  Skin: Without rash non-diaphoretic  : Normal circumcised phallus.  He has descended testicles bilaterally.  The left hemiscrotum has a palpable varicocele on the cord structure on the left side.  There is a similar smaller swelling on the right.  The testicles are without mass and nontender to palpation.    Assessment: "     Procedures    The encounter diagnosis was Bilateral varicoceles.    Plan:     1. Bilateral varicoceles  Ambulatory referral to Urology         Patient Instructions     Establish care with primary care provider for definitive evaluation and treatment and outpatient imaging with ultrasound.  If unable to get in a reasonable amount of time, or it becomes symptomatic and painful, referral to urology.        What is Varicocele?    A varicocele is a swelling in the veins above the testicles. It is similar to having varicose veins in the legs. The swelling occurs when too much blood collects in the veins because they are damaged. A varicocele most often occurs around the left testicle.  Veins in the scrotum  The scrotum is a sac of skin that covers the testicles -- the male sex organs that produce sperm and the male hormones. Blood vessels in the scrotum carry blood to and from the testicles. The vessels that carry blood away from the testicles are called veins.  When theres a problem in the veins  The veins that carry blood from the testicles extend up into the groin. That means the blood has to travel upward a long way. Valves in the veins act like simon to keep the blood from flowing back toward the testicles. In some men, these valves dont close fully. Or the muscles in the walls of the veins may be weak. Then some blood flows back into the scrotum and collects in the veins above the testicles. This makes the veins enlarge.  What are the symptoms?  A varicocele often causes no symptoms at all. Or it may cause an achy or heavy feeling in the scrotum. The pain may be worse later in the day or after standing for a long time. You may also see swollen veins just under the skin in the scrotum.  A varicocele can lower sperm count  When blood collects in the veins above the testicles, changes occur that can reduce the number and the quality of the sperm. In many cases, sperm count improves after treatment.   Date Last  Reviewed: 9/22/2014 2000-2016 [employer]. [address] [place] [address], [place] [address]. All rights reserved. This information is not intended as a substitute for professional medical care. Always follow your healthcare professional's instructions.        Treating Varicocele    In most cases, a varicocele is not serious. Your doctor may wait and watch the problem for a while. If needed, surgery or another procedure to close off the enlarged veins can be done. This may be suggested if you have pain, if the veins become unsightly, or if you and your partner are having trouble conceiving a baby.  Surgery: Open Varicocelectomy  Your health care provider may recommend surgery to tie off the enlarged veins around the testicles:    You are given anesthesia to keep you comfortable. You may or may not be asleep, depending on the medication you are given.     An incision is made in the groin or in the lower abdomen.    The veins are then cut and tied off.    The incision is closed with sutures, staples, or surgical tape.  Surgery: Laparoscopic Varicocelectomy  Instead of open surgery, laparoscopic surgery may be recommended. This is surgery done through small incisions with an instrument called a laparoscope (a thin, telescope-like device):    You are given general anesthesia to make you sleep during the procedure.    A few small openings are made in the lower abdomen. The laparoscope is inserted through 1 opening. Surgical instruments are inserted through the other small openings.    The laparoscope sends magnified pictures to a video monitor. Using these pictures, the surgeon identifies the veins that need treatment.    The veins are clamped to seal them off.    The instruments are removed. The incisions are closed with sutures, staples, or surgical tape.  Percutaneous Embolization  In place of surgery, your health care provider might recommend sealing off the enlarged veins using percutaneous embolization. A  radiologic procedure called a venogram is used to create a map of the veins. A tube is then placed in the large vein in the groin. Materials are injected through this tube into the enlarged veins to block them off.  After Your Varicocele Procedure    You may feel some pain in your testicle for a few days.    Mild swelling around the testicle is normal after the procedure. Put an ice pack (or bag of frozen peas or rice) wrapped in a thin towel on the area to help. Do this for no longer than 20 minutes at a time.    Plan to rest for 2 days to 3 days.  When to Call Your Health Care Provider  Contact your health care provider right away if you have:     Ongoing pain not relieved by pain medication.    Black-and-blue around the incision, bleeding from the incision, or swelling in the scrotum.    A fever above 100.4 F (38 C), or greenish or foul-smelling drainage from the incision.      Risks of Varicocele Repair  Risks and possible complications of these procedures include:    Hematoma (blood clot)    Infection    Fluid accumulation around testicle (hydrocele)    Injury to the nerves in the groin or scrotum    Injury to scrotal tissue or structures    Injury to the artery that supplies blood to the testicle    Risks of general anesthesia, if used    Damage to abdominal structures (laparoscopic surgery)   Date Last Reviewed: 9/21/2014 2000-2016 [employer]. [address] [place] [address], [place] [address]. All rights reserved. This information is not intended as a substitute for professional medical care. Always follow your healthcare professional's instructions.

## 2021-06-27 NOTE — PROGRESS NOTES
Progress Notes by Marcos Montilla PA-C at 2019  1:30 PM     Author: Marcos Montilla PA-C Service: -- Author Type: Physician Assistant    Filed: 2019  1:51 PM Encounter Date: 2019 Status: Signed    : Marcos Montilla PA-C (Physician Assistant)       Subjective:      Patient ID: Tyler Hughes is a 23 y.o. male.    Chief Complaint:    HPI  Tyler Hughes is a 23 y.o. male who presents today complaining of four day acute onset of sore throat and odynophagia.  Patient denies fever, chills, night sweats, fatigue, vomiting, diarrhea, skin rash, abdominal pain or urinary symptoms.  He is concerned that he has had an enlarged right tonsil and pus on the tonsil.  He is still able to swallow and handle his own secretions and has no difficulty with breathing.    No known sick contacts for strep throat.    Has not tried treatment for this over-the-counter.    Past Medical History:   Diagnosis Date   ? Colon polyps     age 14 months, 2 polyps removed for rectal bleeding       Past Surgical History:   Procedure Laterality Date   ? WISDOM TOOTH EXTRACTION         Family History   Problem Relation Age of Onset   ? Aneurysm Maternal Grandmother        Social History     Tobacco Use   ? Smoking status: Current Every Day Smoker     Packs/day: 1.00     Types: Cigars     Last attempt to quit: 3/31/2018     Years since quittin.8   ? Smokeless tobacco: Former User   Substance Use Topics   ? Alcohol use: Yes     Alcohol/week: 0.6 oz     Types: 1 Glasses of wine per week   ? Drug use: No     Comment: hx of marijuana, distant       Review of Systems  As above in HPI, otherwise balance of Review of Systems are negative.    Objective:     /70 (Patient Site: Right Arm, Patient Position: Sitting, Cuff Size: Adult Large)   Pulse 96   Temp 98.1  F (36.7  C) (Oral)   Resp 16   Wt (!) 349 lb (158.3 kg)   SpO2 97%   BMI 43.62 kg/m      Physical Exam  General: Patient is resting comfortably no acute distress is  afebrile  HEENT: Head is normocephalic atraumatic   eyes are PERRL EOMI sclera anicteric   TMs are clear bilaterally  Throat is with mild pharyngeal wall erythema and no exudate  No cervical lymphadenopathy present  LUNGS: Clear to auscultation bilaterally  HEART: Regular rate and rhythm  Skin: Without rash non-diaphoretic    Lab:  Recent Results (from the past 24 hour(s))   Rapid Strep A Screen-Throat swab   Result Value Ref Range    Rapid Strep A Antigen No Group A Strep detected, presumptive negative No Group A Strep detected, presumptive negative       Assessment:     Procedures    1. Exudative pharyngitis     2. Throat pain  Rapid Strep A Screen-Throat swab    Group A Strep, RNA Direct Detection, Throat       Plan:     1. Exudative pharyngitis  penicillin VK (PEN VK) 500 MG tablet   2. Throat pain  Rapid Strep A Screen-Throat swab    Group A Strep, RNA Direct Detection, Throat         Patient Instructions     Suggested increased rest increased fluids and bedside humidification  Over-the-counter Tylenol for comfort.  Follow packaging directions  Over-the-counter throat lozenges with benzocaine such as Cepacol may be used if indicated and is not a choking hazard based on age.  Follow packaging directions.  Do not overuse the benzocaine as it will dry the throat and make it uncomfortable.  Noncontagious after 24 hours on the antibiotic.  Change toothbrush out after 48 hours to avoid reinfecting the mouth.  Follow-up after completion of the antibiotic if any consultation or sequela.      Self-Care for Sore Throats  Sore throats happen for many reasons, such as colds, allergies, and infections caused by viruses or bacteria. In any case, your throat becomes red and sore. Your goal for self-care is to reduce your discomfort while giving your throat a chance to heal.    Moisten and soothe your throat  Tips include the following:    Try a sip of water first thing after waking up.    Keep your throat moist by drinking 6  or more glasses of clear liquids every day.    Run a cool-air humidifier in your room overnight.    Avoid cigarette smoke.     Suck on throat lozenges, cough drops, hard candy, ice chips, or frozen fruit-juice bars. Use the sugar-free versions if your diet or medical condition requires them.  Gargle to ease irritation  Gargling every hour or 2 can ease irritation. Try gargling with 1 of these solutions:    1/4 teaspoon of salt in 1/2 cup of warm water    An over-the-counter anesthetic gargle  Use medicine for more relief  Over-the-counter medicine can reduce sore throat symptoms. Ask your pharmacist if you have questions about which medicine to use:    Ease pain with anesthetic sprays. Aspirin or an aspirin substitute also helps. Remember, never give aspirin to anyone 18 or younger, or if you are already taking blood thinners.     For sore throats caused by allergies, try antihistamines to block the allergic reaction.    Remember: unless a sore throat is caused by a bacterial infection, antibiotics wont help you.  Prevent future sore throats  Prevention tips include the following:    Stop smoking or reduce contact with secondhand smoke. Smoke irritates the tender throat lining.    Limit contact with pets and with allergy-causing substances, such as pollen and mold.    When youre around someone with a sore throat or cold, wash your hands often to keep viruses or bacteria from spreading.    Dont strain your vocal cords.  Call your healthcare provider  Contact your healthcare provider if you have:    A temperature over 101 F (38.3 C)    White spots on the throat    Great difficulty swallowing    Trouble breathing    A skin rash    Recent exposure to someone else with strep bacteria    Severe hoarseness and swollen glands in the neck or jaw   Date Last Reviewed: 8/1/2016 2000-2016 [employer]. [address] [place] [address], [place] [address]. All rights reserved. This information is not intended as a  substitute for professional medical care. Always follow your healthcare professional's instructions.

## 2021-08-06 ENCOUNTER — TRANSFERRED RECORDS (OUTPATIENT)
Dept: HEALTH INFORMATION MANAGEMENT | Facility: CLINIC | Age: 26
End: 2021-08-06

## 2021-10-10 ENCOUNTER — HEALTH MAINTENANCE LETTER (OUTPATIENT)
Age: 26
End: 2021-10-10

## 2021-10-15 ENCOUNTER — E-VISIT (OUTPATIENT)
Dept: URGENT CARE | Facility: CLINIC | Age: 26
End: 2021-10-15
Payer: COMMERCIAL

## 2021-10-15 DIAGNOSIS — J06.9 VIRAL URI: ICD-10-CM

## 2021-10-15 DIAGNOSIS — J06.9 VIRAL UPPER RESPIRATORY INFECTION: Primary | ICD-10-CM

## 2021-10-15 DIAGNOSIS — Z20.822 SUSPECTED 2019 NOVEL CORONAVIRUS INFECTION: ICD-10-CM

## 2021-10-15 PROCEDURE — 99421 OL DIG E/M SVC 5-10 MIN: CPT | Performed by: NURSE PRACTITIONER

## 2021-10-15 NOTE — PATIENT INSTRUCTIONS
Dear John,    It sounds like you have a viral upper respiratory infection.  I am concerned he may have COVID-19 given the loss of your taste and smell and have entered an order for you to be tested for this.  Follow the link you will receive through [facility] to schedule the lab test or you may call [phone] to schedule it over the phone.    You may take Tylenol or ibuprofen as needed for aches and pains or fever.  Consider using Delsym cough syrup if the cough is bothersome.  Drink plenty of fluids and rest as much as possible.  You should be isolating at home for at least 10 days from the start of your symptoms.    Sinus infections take 10 to 14 days to develop so I do not believe you have a sinus infection at this point.    I have included a note for work or school.    If your symptoms worsen or you develop severe shortness of breath please go to the emergency room for further assessment.    Take care,    SILVINO Maldonado [facility]      October 15, 2021    To whom it may concern:    Tyler Hughes was seen on 10/15/2021 and will need to be excused from work through 10/23/2021 due to illness.      Sincerely,      SILVINO Maldonado [facility]

## 2021-11-18 ENCOUNTER — OFFICE VISIT (OUTPATIENT)
Dept: FAMILY MEDICINE | Facility: CLINIC | Age: 26
End: 2021-11-18
Payer: COMMERCIAL

## 2021-11-18 VITALS
OXYGEN SATURATION: 96 % | SYSTOLIC BLOOD PRESSURE: 130 MMHG | WEIGHT: 315 LBS | HEART RATE: 96 BPM | DIASTOLIC BLOOD PRESSURE: 84 MMHG | BODY MASS INDEX: 47.25 KG/M2
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
## 2021-11-18 PROCEDURE — 90471 IMMUNIZATION ADMIN: CPT | Performed by: FAMILY MEDICINE

## 2021-11-18 PROCEDURE — 90686 IIV4 VACC NO PRSV 0.5 ML IM: CPT | Performed by: FAMILY MEDICINE
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
## 2021-12-02 ENCOUNTER — TRANSFERRED RECORDS (OUTPATIENT)
Dept: HEALTH INFORMATION MANAGEMENT | Facility: CLINIC | Age: 26
End: 2021-12-02
Payer: COMMERCIAL

## 2021-12-14 ENCOUNTER — TRANSFERRED RECORDS (OUTPATIENT)
Dept: HEALTH INFORMATION MANAGEMENT | Facility: CLINIC | Age: 26
End: 2021-12-14
Payer: COMMERCIAL

## 2022-02-11 ENCOUNTER — OFFICE VISIT (OUTPATIENT)
Dept: FAMILY MEDICINE | Facility: CLINIC | Age: 27
End: 2022-02-11
Payer: COMMERCIAL

## 2022-02-11 VITALS
HEART RATE: 105 BPM | WEIGHT: 315 LBS | OXYGEN SATURATION: 94 % | SYSTOLIC BLOOD PRESSURE: 120 MMHG | BODY MASS INDEX: 48.97 KG/M2 | DIASTOLIC BLOOD PRESSURE: 82 MMHG

## 2022-02-11 DIAGNOSIS — R45.89 SYMPTOMS OF DEPRESSION: ICD-10-CM

## 2022-02-11 DIAGNOSIS — E66.01 MORBID OBESITY (H): Primary | ICD-10-CM

## 2022-02-11 DIAGNOSIS — R53.83 FATIGUE, UNSPECIFIED TYPE: ICD-10-CM

## 2022-02-11 LAB
ERYTHROCYTE [DISTWIDTH] IN BLOOD BY AUTOMATED COUNT: 12.1 % (ref 10–15)
HCT VFR BLD AUTO: 45.8 % (ref 40–53)
HGB BLD-MCNC: 15.6 G/DL (ref 13.3–17.7)
MCH RBC QN AUTO: 31.1 PG (ref 26.5–33)
MCHC RBC AUTO-ENTMCNC: 34.1 G/DL (ref 31.5–36.5)
MCV RBC AUTO: 91 FL (ref 78–100)
PLATELET # BLD AUTO: 185 10E3/UL (ref 150–450)
RBC # BLD AUTO: 5.02 10E6/UL (ref 4.4–5.9)
TSH SERPL DL<=0.005 MIU/L-ACNC: 1.28 UIU/ML (ref 0.3–5)
WBC # BLD AUTO: 6.1 10E3/UL (ref 4–11)

## 2022-02-11 PROCEDURE — 99214 OFFICE O/P EST MOD 30 MIN: CPT | Performed by: FAMILY MEDICINE

## 2022-02-11 PROCEDURE — 36415 COLL VENOUS BLD VENIPUNCTURE: CPT | Performed by: FAMILY MEDICINE

## 2022-02-11 PROCEDURE — 85027 COMPLETE CBC AUTOMATED: CPT | Performed by: FAMILY MEDICINE

## 2022-02-11 PROCEDURE — 84443 ASSAY THYROID STIM HORMONE: CPT | Performed by: FAMILY MEDICINE

## 2022-02-11 PROCEDURE — 82306 VITAMIN D 25 HYDROXY: CPT | Performed by: FAMILY MEDICINE

## 2022-02-11 RX ORDER — BUPROPION HYDROCHLORIDE 150 MG/1
150 TABLET ORAL EVERY MORNING
Qty: 30 TABLET | Refills: 3 | Status: SHIPPED | OUTPATIENT
Start: 2022-02-11 | End: 2022-03-11

## 2022-02-11 NOTE — PROGRESS NOTES
Assessment & Plan      Patient presents to clinic with a long history of musculoskeletal complaints including scoliosis which complicates his ability to exercise. In addition he has a lot of life stressors and 2 young children which makes it difficult to prioritize his own health. Patient is morbidly obese but experiencing significant fatigue/lethargy/mood concerns. I suspect these are mostly linked together and with shared decision making we chose to do the following:    Morbid obesity (H)  Consider phentermine:  - Comprehensive Weight Management    Fatigue, unspecified type  Search for exogenous sources of lethargy/fatigue:  - Vitamin D Deficiency  - TSH with free T4 reflex  - CBC with platelets    Symptoms of depression  Initiation on long-acting low-dose Wellbutrin with a low threshold to increase to 300 mg once daily dosing:  - buPROPion (WELLBUTRIN XL) 150 MG 24 hr tablet  Dispense: 30 tablet; Refill: 3    Recommended walking 1 mile per day.             Return in about 4 weeks (around 3/11/2022) for Routine preventive, Follow up.    Consuelo Samson MD  [facility]    Bill Lopez is a 26 year old who presents for the following health issues     HPI     Patient struggles to exercise. He finds it's hard to even take the trash out because it takes too much energy. He finds he can't make himself get to the gym. He also has neck, shoulder, hand pain. He has numbness in the upper extremities. He worries that this is contributing. A massage therapist seems to help but there is a financial constraint there. His next appt is today with her (tries to go twice per month). He also has a history of scoliosis. He also endorses a lower sex drive.     Review of Systems   Constitutional, HEENT, cardiovascular, pulmonary, GI, , musculoskeletal, neuro, skin, endocrine and psych systems are negative, except as otherwise noted.      Objective    /82 (BP Location: Left arm, Patient Position:  Sitting, Cuff Size: Adult Large)   Pulse 105   Wt (!) 177.7 kg (391 lb 12.8 oz)   SpO2 94%   BMI 48.97 kg/m    Body mass index is 48.97 kg/m .  Physical Exam   Gen: NAD  Psych: Normal affect, no hallucinations or delusions, not tearful

## 2022-02-14 DIAGNOSIS — E55.9 VITAMIN D DEFICIENCY: Primary | ICD-10-CM

## 2022-02-14 LAB — DEPRECATED CALCIDIOL+CALCIFEROL SERPL-MC: 16 UG/L (ref 30–80)

## 2022-02-14 RX ORDER — ERGOCALCIFEROL 1.25 MG/1
50000 CAPSULE, LIQUID FILLED ORAL WEEKLY
Qty: 12 CAPSULE | Refills: 0 | Status: SHIPPED | OUTPATIENT
Start: 2022-02-14 | End: 2023-05-25

## 2022-03-11 ENCOUNTER — TRANSFERRED RECORDS (OUTPATIENT)
Dept: HEALTH INFORMATION MANAGEMENT | Facility: CLINIC | Age: 27
End: 2022-03-11

## 2022-03-11 ENCOUNTER — OFFICE VISIT (OUTPATIENT)
Dept: FAMILY MEDICINE | Facility: CLINIC | Age: 27
End: 2022-03-11
Payer: COMMERCIAL

## 2022-03-11 VITALS — HEART RATE: 91 BPM | SYSTOLIC BLOOD PRESSURE: 122 MMHG | DIASTOLIC BLOOD PRESSURE: 78 MMHG | OXYGEN SATURATION: 97 %

## 2022-03-11 DIAGNOSIS — E66.01 MORBID OBESITY (H): Primary | ICD-10-CM

## 2022-03-11 DIAGNOSIS — M62.838 MUSCLE SPASM: ICD-10-CM

## 2022-03-11 DIAGNOSIS — R53.83 FATIGUE, UNSPECIFIED TYPE: ICD-10-CM

## 2022-03-11 PROCEDURE — 99214 OFFICE O/P EST MOD 30 MIN: CPT | Performed by: FAMILY MEDICINE

## 2022-03-11 RX ORDER — CYCLOBENZAPRINE HCL 10 MG
10 TABLET ORAL 3 TIMES DAILY PRN
Qty: 30 TABLET | Refills: 3 | Status: SHIPPED | OUTPATIENT
Start: 2022-03-11 | End: 2023-01-09

## 2022-03-11 NOTE — PROGRESS NOTES
Assessment & Plan     Morbid obesity (H)  Fatigue, unspecified type  Patient was unable to attend the comprehensive weight management program secondary to out-of-pocket cost.  Instead placed a referral to nutrition and for sleep evaluation as it may be the patient is sleep apnea contributing to daytime fatigue  - Nutrition Referral  - Adult Sleep Eval & Management  Referral    Muscle spasm  Refilled  - cyclobenzaprine (FLEXERIL) 10 MG tablet  Dispense: 30 tablet; Refill: 3          No follow-ups on file.    Consuelo Samson MD  [facility] 26 [facility] who presents for the following health issues  HPI     Follow up:  -At previous visit we had referred patient to the comprehensive weight management program and run some basic labs.  With respect to symptoms of depression we had trialed an initiation on low-dose Wellbutrin extended release and walking one mile per day.   - patient tried the wellbutrin and a few days in he was pretty grouchy and then noticed no changed; he tried for two weeks, no improvement in mood or weight  - for the 'migraines' and shoulder pain with bilateral arm paresthesias: he is taking gabapentin 300 mg TID, has been to PT, has been to ortho, has had MRIs and had massage therapy; he does also have a history of scoliosis  - he does endorse daytime fatigue      Review of Systems   Constitutional, HEENT, cardiovascular, pulmonary, GI, , musculoskeletal, neuro, skin, endocrine and psych systems are negative, except as otherwise noted.      Objective    /78   Pulse 91   SpO2 97%   There is no height or weight on file to calculate BMI.  Physical Exam   Gen: NAD  Psych: Normal affect, no hallucinations or delusions, not tearful

## 2022-04-05 ENCOUNTER — E-VISIT (OUTPATIENT)
Dept: URGENT CARE | Facility: CLINIC | Age: 27
End: 2022-04-05
Payer: COMMERCIAL

## 2022-04-05 DIAGNOSIS — R10.30 LOWER ABDOMINAL PAIN: Primary | ICD-10-CM

## 2022-04-05 PROCEDURE — 99207 PR NON-BILLABLE SERV PER CHARTING: CPT | Performed by: FAMILY MEDICINE

## 2022-04-05 NOTE — PATIENT INSTRUCTIONS
Dear Tyler Hughes,  Because of abdominal pain best to be seen in person to rule out any appendicits.  We are sorry you are not feeling well. Based on the responses you provided, it is recommended that you be seen in-person in urgent care so we can better evaluate your symptoms. Please click here to find the nearest urgent care location to you.   You will not be charged for this Visit. Thank you for trusting us with your care.    Evelny Faulkner MD

## 2022-04-11 ENCOUNTER — E-VISIT (OUTPATIENT)
Dept: FAMILY MEDICINE | Facility: CLINIC | Age: 27
End: 2022-04-11
Payer: COMMERCIAL

## 2022-04-11 DIAGNOSIS — B35.4 TINEA CORPORIS: Primary | ICD-10-CM

## 2022-04-11 PROCEDURE — 99421 OL DIG E/M SVC 5-10 MIN: CPT | Performed by: PHYSICIAN ASSISTANT

## 2022-04-11 RX ORDER — TOLNAFTATE 1 G/100G
POWDER TOPICAL 2 TIMES DAILY
Qty: 45 G | Refills: 1 | Status: SHIPPED | OUTPATIENT
Start: 2022-04-11 | End: 2022-04-12

## 2022-04-11 RX ORDER — FLUCONAZOLE 200 MG/1
200 TABLET ORAL
Qty: 2 TABLET | Refills: 0 | Status: SHIPPED | OUTPATIENT
Start: 2022-04-11 | End: 2022-04-12

## 2022-04-12 ENCOUNTER — OFFICE VISIT (OUTPATIENT)
Dept: FAMILY MEDICINE | Facility: CLINIC | Age: 27
End: 2022-04-12
Payer: COMMERCIAL

## 2022-04-12 VITALS — DIASTOLIC BLOOD PRESSURE: 74 MMHG | SYSTOLIC BLOOD PRESSURE: 122 MMHG | HEART RATE: 100 BPM | OXYGEN SATURATION: 97 %

## 2022-04-12 DIAGNOSIS — E66.01 MORBID OBESITY (H): ICD-10-CM

## 2022-04-12 DIAGNOSIS — B37.2 CANDIDIASIS OF SKIN: Primary | ICD-10-CM

## 2022-04-12 DIAGNOSIS — Z13.1 SCREENING FOR DIABETES MELLITUS: ICD-10-CM

## 2022-04-12 LAB — GLUCOSE BLD-MCNC: 66 MG/DL (ref 60–99)

## 2022-04-12 PROCEDURE — 83036 HEMOGLOBIN GLYCOSYLATED A1C: CPT | Performed by: FAMILY MEDICINE

## 2022-04-12 PROCEDURE — 36415 COLL VENOUS BLD VENIPUNCTURE: CPT | Performed by: FAMILY MEDICINE

## 2022-04-12 PROCEDURE — 99214 OFFICE O/P EST MOD 30 MIN: CPT | Performed by: FAMILY MEDICINE

## 2022-04-12 PROCEDURE — 82947 ASSAY GLUCOSE BLOOD QUANT: CPT | Performed by: FAMILY MEDICINE

## 2022-04-12 RX ORDER — FLUCONAZOLE 150 MG/1
150 TABLET ORAL DAILY
Qty: 7 TABLET | Refills: 0 | Status: SHIPPED | OUTPATIENT
Start: 2022-04-12 | End: 2022-04-19

## 2022-04-12 NOTE — ASSESSMENT & PLAN NOTE
Persistent recurrent Candida of the skin folds including axilla bilaterally, under the pannus, in the groin skin folds and in the bellybutton area.    Nystatin cream is not helpful.    I have prescribed Diflucan 150 mg orally daily x7 days.    I would like to also check him for hyperglycemic state by getting a random glucose and an A1c today.

## 2022-04-12 NOTE — PROGRESS NOTES
Problem List Items Addressed This Visit        Digestive    Morbid obesity (H)     With his extreme excess weight I do worry about diabetes which I will screen for today.              Musculoskeletal and Integumentary    Candidiasis of skin - Primary     Persistent recurrent Candida of the skin folds including axilla bilaterally, under the pannus, in the groin skin folds and in the bellybutton area.    Nystatin cream is not helpful.    I have prescribed Diflucan 150 mg orally daily x7 days.    I would like to also check him for hyperglycemic state by getting a random glucose and an A1c today.           Relevant Medications    fluconazole (DIFLUCAN) 150 MG tablet      Other Visit Diagnoses     Screening for diabetes mellitus        Relevant Orders    Glucose    Hemoglobin A1c          Chief Complaint   Patient presents with     Belly button infection     rash under armpits     used nystatin cream without help        Subjective   John is a 27 year old who presents for the following health issues-the history is that he gets recurrent infections in his bellybutton that he says epsom salts usually resolve    Few weeks ago had itchy irritated skin tag in left axilla. This then turned into a rash that he thinks spread into arm pits on both sides.     He often gets a belly button rash and he did recently have this rash and now the rash has extended into under his belly area and in the groin folds.    It has been extremely itchy and ongoing and is not responding to nystatin cream for over 10 days.    History of Present Illness       Reason for visit:  Rash  Symptom onset:  3-4 weeks ago  Symptoms include:  Belly armpits thighs rash  Symptom intensity:  Severe  Symptom progression:  Staying the same  Had these symptoms before:  No  What makes it worse:  No  What makes it better:  No    He eats 2-3 servings of fruits and vegetables daily.He consumes 2 sweetened beverage(s) daily.He exercises with enough effort to increase his  heart rate 9 or less minutes per day.  He exercises with enough effort to increase his heart rate 3 or less days per week. He is missing 1 dose(s) of medications per week.  He is not taking prescribed medications regularly due to remembering to take.           Objective    /74 (BP Location: Left arm, Patient Position: Left side, Cuff Size: Adult Large)   Pulse 100   SpO2 97%   There is no height or weight on file to calculate BMI.  Physical Exam  Constitutional:       Appearance: Normal appearance.   HENT:      Head: Normocephalic and atraumatic.   Cardiovascular:      Rate and Rhythm: Normal rate and regular rhythm.   Pulmonary:      Effort: Pulmonary effort is normal.   Musculoskeletal:         General: Normal range of motion.      Cervical back: Normal range of motion and neck supple.   Skin:     Comments: Classic Candida infection in the axilla bilaterally with erythematous macules with classic satellite lesions noted extending from his armpits over the torso , similar lesions noted under the pannus of his belly, and in the groin skin folds.   Neurological:      General: No focal deficit present.      Mental Status: He is alert.

## 2022-04-13 LAB — HBA1C MFR BLD: 5.1 %

## 2022-06-28 ENCOUNTER — OFFICE VISIT (OUTPATIENT)
Dept: FAMILY MEDICINE | Facility: CLINIC | Age: 27
End: 2022-06-28
Payer: COMMERCIAL

## 2022-06-28 VITALS
OXYGEN SATURATION: 96 % | TEMPERATURE: 97.3 F | WEIGHT: 315 LBS | SYSTOLIC BLOOD PRESSURE: 118 MMHG | RESPIRATION RATE: 18 BRPM | DIASTOLIC BLOOD PRESSURE: 84 MMHG | BODY MASS INDEX: 48.97 KG/M2 | HEART RATE: 89 BPM

## 2022-06-28 DIAGNOSIS — H01.025 SQUAMOUS BLEPHARITIS OF LEFT LOWER EYELID: Primary | ICD-10-CM

## 2022-06-28 PROCEDURE — 99213 OFFICE O/P EST LOW 20 MIN: CPT | Performed by: NURSE PRACTITIONER

## 2022-06-28 RX ORDER — CETIRIZINE HYDROCHLORIDE 10 MG/1
10 TABLET ORAL EVERY EVENING
Qty: 30 TABLET | Refills: 1 | Status: SHIPPED | OUTPATIENT
Start: 2022-06-28 | End: 2023-05-25

## 2022-06-28 RX ORDER — NEOMYCIN SULFATE, POLYMYXIN B SULFATE AND DEXAMETHASONE 3.5; 10000; 1 MG/ML; [USP'U]/ML; MG/ML
1-2 SUSPENSION/ DROPS OPHTHALMIC EVERY 4 HOURS
Qty: 5 ML | Refills: 0 | Status: SHIPPED | OUTPATIENT
Start: 2022-06-28 | End: 2022-07-05

## 2022-06-28 ASSESSMENT — ENCOUNTER SYMPTOMS
EYE PAIN: 0
FATIGUE: 0
EYE REDNESS: 1
PHOTOPHOBIA: 0
FEVER: 0
EYE DISCHARGE: 0
EYE ITCHING: 0
CHILLS: 0

## 2022-06-28 NOTE — PROGRESS NOTES
Patient presents with:  Eye Problem: Stye on lt eye x 3 days - applying hot compress and eye drops at home but doesn't seem to work       Clinical Decision Making: Focused exam positive for left lower eyelid with swelling and erythremia present.  Mild nasal congestion, bilateral breath sounds clear throughout.    Clinical presentation consistent with left lower eyelid blepharitis.  Will treat with ophthalmic antibiotic drops.  Discussed the role of OTC antihistamine use to assist with swelling and encouraged ongoing warm compress application.  Discussed red flag symptoms and when to return for reevaluation, patient verbalized understanding.  Education provided.      ICD-10-CM    1. Squamous blepharitis of left lower eyelid  H01.025 neomycin-polymyxin-dexamethasone (MAXITROL) [id number] SUSP ophthalmic susp     cetirizine (ZYRTEC) 10 MG tablet       There are no Patient Instructions on file for this visit.    HPI: Tyler Hughes is a 27 year old male who presents today complaining of left lower eyelid with a stye that has been coming persistently more irritable and swollen for the past 3 days. Patient reports using warm compress application as well as eyedrops with minimal relief.  Patient denies any specific pain, however reports significant irritability.  Denies any changes in vision or photophobia.    History obtained from the patient.    Problem List:  2022-04: Candidiasis of skin  2021-01: Morbid obesity (H)  2021-01: Chronic left shoulder pain  2018-03: Hx of colonic polyps  2018-03: Left varicocele  2018-03: Adolescent idiopathic scoliosis      Past Medical History:   Diagnosis Date     Attention deficit disorder     Created by Conversion  Replacement Utility updated for latest IMO load     Colon polyps     age 14 months, 2 polyps removed for rectal bleeding     Obesity, unspecified     Created by Conversion        Social History     Tobacco Use     Smoking status: Former Smoker     Packs/day: 1.00      Types: Cigars     Start date: 12/1/2020     Smokeless tobacco: Former User     Tobacco comment: E CIG   Substance Use Topics     Alcohol use: Yes       Review of Systems   Constitutional: Negative for chills, fatigue and fever.   Eyes: Positive for redness. Negative for photophobia, pain, discharge, itching and visual disturbance.       Vitals:    06/28/22 1700   BP: 118/84   Pulse: 89   Resp: 18   Temp: 97.3  F (36.3  C)   TempSrc: Tympanic   SpO2: 96%   Weight: (!) 177.7 kg (391 lb 12.1 oz)       Physical Exam  Constitutional:       Appearance: Normal appearance.   HENT:      Head: Normocephalic and atraumatic.      Nose: Congestion present. No rhinorrhea.      Mouth/Throat:      Pharynx: No oropharyngeal exudate or posterior oropharyngeal erythema.   Eyes:      General:         Right eye: No discharge.      Extraocular Movements: Extraocular movements intact.      Pupils: Pupils are equal, round, and reactive to light.      Comments: LEFT lower eyelid with swelling and erythremia present   Cardiovascular:      Rate and Rhythm: Normal rate and regular rhythm.      Pulses: Normal pulses.      Heart sounds: Normal heart sounds.   Pulmonary:      Effort: Pulmonary effort is normal.      Breath sounds: Normal breath sounds.   Musculoskeletal:      Cervical back: Neck supple.   Lymphadenopathy:      Cervical: No cervical adenopathy.   Skin:     General: Skin is warm.      Capillary Refill: Capillary refill takes less than 2 seconds.   Neurological:      Mental Status: He is alert and oriented to person, place, and time.   Psychiatric:         Behavior: Behavior normal.         Labs:  No results found for any visits on 06/28/22.      At the end of the encounter, I discussed results, diagnosis, medications. Discussed red flags for immediate return to clinic/ER, as well as indications for follow up if no improvement. Patient understood and agreed to plan.     ABIDA Murdock CNP

## 2022-07-17 ENCOUNTER — HEALTH MAINTENANCE LETTER (OUTPATIENT)
Age: 27
End: 2022-07-17

## 2022-09-24 ENCOUNTER — HEALTH MAINTENANCE LETTER (OUTPATIENT)
Age: 27
End: 2022-09-24

## 2022-10-10 ENCOUNTER — E-VISIT (OUTPATIENT)
Dept: URGENT CARE | Facility: CLINIC | Age: 27
End: 2022-10-10
Payer: COMMERCIAL

## 2022-10-10 DIAGNOSIS — J06.9 VIRAL URI: Primary | ICD-10-CM

## 2022-10-10 PROCEDURE — 99421 OL DIG E/M SVC 5-10 MIN: CPT | Performed by: PHYSICIAN ASSISTANT

## 2022-10-10 RX ORDER — FLUTICASONE PROPIONATE 50 MCG
1 SPRAY, SUSPENSION (ML) NASAL DAILY
Qty: 16 G | Refills: 0 | Status: SHIPPED | OUTPATIENT
Start: 2022-10-10 | End: 2023-05-25

## 2022-10-10 RX ORDER — BENZONATATE 100 MG/1
100 CAPSULE ORAL 3 TIMES DAILY PRN
Qty: 30 CAPSULE | Refills: 0 | Status: SHIPPED | OUTPATIENT
Start: 2022-10-10 | End: 2023-05-25

## 2022-10-10 RX ORDER — OXYMETAZOLINE HYDROCHLORIDE 0.05 G/100ML
2 SPRAY NASAL 2 TIMES DAILY
Qty: 1.2 ML | Refills: 0 | Status: SHIPPED | OUTPATIENT
Start: 2022-10-10 | End: 2022-10-13

## 2022-10-10 NOTE — PATIENT INSTRUCTIONS
Viral Upper Respiratory Illness (Adult)    You have a viral upper respiratory illness (URI), which is another term for the common cold. This illness is contagious during the first few days. It is spread through the air by coughing and sneezing. It may also be spread by direct contact (touching the sick person and then touching your own eyes, nose, or mouth). Frequent handwashing will decrease risk of spread. Most viral illnesses go away within 7 to 10 days with rest and simple home remedies. Sometimes the illness may last for several weeks. Antibiotics will not kill a virus, and they are generally not prescribed for this condition.  Home care    If symptoms are severe, rest at home for the first 2 to 3 days. When you resume activity, don't let yourself get too tired.    Don't smoke. If you need help stopping, talk with your healthcare provider.    Avoid being exposed to cigarette smoke (yours or others ).    You may use acetaminophen or ibuprofen to control pain and fever, unless another medicine was prescribed. If you have chronic liver or kidney disease, have ever had a stomach ulcer or gastrointestinal bleeding, or are taking blood-thinning medicines, talk with your healthcare provider before using these medicines. Aspirin should never be given to anyone under 18 years of age who is ill with a viral infection or fever. It may cause severe liver or brain damage.    Your appetite may be poor, so a light diet is fine. Stay well hydrated by drinking 6 to 8 glasses of fluids per day (water, soft drinks, juices, tea, or soup). Extra fluids will help loosen secretions in the nose and lungs.    Over-the-counter cold medicines will not shorten the length of time you re sick, but they may be helpful for the following symptoms: cough, sore throat, and nasal and sinus congestion. If you take prescription medicines, ask your healthcare provider or pharmacist which over-the-counter medicines are safe to use. (Note: Don't  use decongestants if you have high blood pressure.)  Follow-up care  Follow up with your healthcare provider, or as advised.  When to seek medical advice  Call your healthcare provider right away if any of these occur:    Cough with lots of colored sputum (mucus)    Severe headache; face, neck, or ear pain    Difficulty swallowing due to throat pain    Fever of 100.4 F (38 C) or higher, or as directed by your healthcare provider  Call 911  Call 911 if any of these occur:    Chest pain, shortness of breath, wheezing, or difficulty breathing    Coughing up blood    Very severe pain with swallowing, especially if it goes along with a muffled voice   [employer] last reviewed this educational content on 6/1/2018 2000-2021 [employer]. All rights reserved. This information is not intended as a substitute for professional medical care. Always follow your healthcare professional's instructions.        Dear Tyler Hughes    After reviewing your responses, I've been able to diagnose you with?an upper respiratory infection (common cold).?     Based on your responses and diagnosis, I have prescribed Flonase, tessalon perles and Afrin to treat your symptoms. I have sent this to your pharmacy.?     It is also important to stay well hydrated, get lots of rest and take over-the-counter decongestants,?tylenol?or ibuprofen if you?are able to?take those medications per your primary care provider to help relieve discomfort.?     It is important that you take?all of?your prescribed medication even if your symptoms are improving after a few doses.? Taking?all of?your medicine helps prevent the symptoms from returning.?     If your symptoms worsen, you develop severe headache, vomiting, high fever (>102), or are not improving in 7 days, please contact your primary care provider for an appointment or visit any of our convenient Walk-in Care or Urgent Care Centers to be seen which can be found on our website?here.?      Thanks again for choosing?us?as your health care partner,?   ?  Brodie Pendleton PA-C?

## 2023-01-06 DIAGNOSIS — M62.838 MUSCLE SPASM: ICD-10-CM

## 2023-01-09 RX ORDER — CYCLOBENZAPRINE HCL 10 MG
TABLET ORAL
Qty: 30 TABLET | Refills: 0 | Status: SHIPPED | OUTPATIENT
Start: 2023-01-09 | End: 2023-06-27

## 2023-01-09 NOTE — TELEPHONE ENCOUNTER
Former patient of [facility] & has not established care with another provider.  Please assign refill request to covering provider per clinic standard process.    Routing refill request to provider for review/approval because:  Drug not on the [facility] [place] [place] refill protocol   No PCP    Last Written Prescription Date:  3/11/22  Last Fill Quantity: 30,  # refills: 3   Last office visit provider:  4/12/22     Requested Prescriptions   Pending Prescriptions Disp Refills     cyclobenzaprine (FLEXERIL) 10 MG tablet [Pharmacy Med Name: CYCLOBENZAPRINE 10 MG TABLET] 30 tablet 3     Sig: TAKE 1 TABLET BY MOUTH 3 TIMES DAILY AS NEEDED FOR MUSCLE SPASMS       There is no refill protocol information for this order          Azael Mark RN 01/09/23 11:43 AM

## 2023-05-08 ENCOUNTER — TRANSFERRED RECORDS (OUTPATIENT)
Dept: HEALTH INFORMATION MANAGEMENT | Facility: CLINIC | Age: 28
End: 2023-05-08
Payer: COMMERCIAL

## 2023-05-09 ENCOUNTER — MEDICAL CORRESPONDENCE (OUTPATIENT)
Dept: HEALTH INFORMATION MANAGEMENT | Facility: CLINIC | Age: 28
End: 2023-05-09
Payer: COMMERCIAL

## 2023-05-11 ENCOUNTER — TRANSCRIBE ORDERS (OUTPATIENT)
Dept: OTHER | Age: 28
End: 2023-05-11

## 2023-05-11 DIAGNOSIS — M25.512 LEFT SHOULDER PAIN: Primary | ICD-10-CM

## 2023-05-11 DIAGNOSIS — R20.2 PARESTHESIA OF ARM: ICD-10-CM

## 2023-05-12 ENCOUNTER — TELEPHONE (OUTPATIENT)
Dept: OTHER | Facility: CLINIC | Age: 28
End: 2023-05-12
Payer: COMMERCIAL

## 2023-05-12 NOTE — TELEPHONE ENCOUNTER
Awaiting notes from [facility], then will send to .     Called [facility] and requested progress notes, they are not yet completed, suggested calling [facility] med recs back in 2 days for notes. They are pushing U/S TOS to PACS.     Pt referred to [facility] by Shahla Alfonso PA for TOS, left shoulder pain and paresthesia of arm.     Dr Stanley Kramer-thoracic outlet US findings compatible w/significant venous thoracic outlet compression at both 90 and 180 degree shoulder abduction.     U/S TOS unilateral Left venous and arterial with maneuvers report in Epic. Requested imaging push and download to PACS.   XR cervical spine 2/3 view 3/12/20 in PACS.     Shahla Rajput PAC @ [facility] Ph: [phone] Fax: [phone].     Pt needs to be scheduled for New pt in clinic consult with Dr. Kramer.  Will route to scheduling to coordinate an appointment at next available.     Appt note: new pt ref by Shahla Alfonso PA for TOS, left shoulder pain and paresthesia of arm. (U/S TOS unilateral Left venous/arterial and XR cervical spine in PACS).     CHANTELLE HouserN, RN  [facility] [place]

## 2023-05-16 NOTE — TELEPHONE ENCOUNTER
Called [facility] a second time for notes, they will route them today via fax to [facility].     Imaging has been previously pushed and is downloaded into PACS.     KANCHAN Houser, RN  [facility]  Office:  [phone] Fax: [phone]

## 2023-05-18 NOTE — TELEPHONE ENCOUNTER
5/11/23 imaging notes from [facility] in media tab, images in PACS.     Routing to  to coordinate referral. See first note below.     CHANTELLE HouserN, RN  [facility]  Office:  [phone] Fax: [phone]

## 2023-05-19 NOTE — TELEPHONE ENCOUNTER
Future Appointments   Date Time Provider Department Center   5/25/2023  3:30 PM Stanley Kramer MD [facility]
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
## 2023-05-25 ENCOUNTER — OFFICE VISIT (OUTPATIENT)
Dept: OTHER | Facility: CLINIC | Age: 28
End: 2023-05-25
Attending: SURGERY
Payer: COMMERCIAL

## 2023-05-25 VITALS — DIASTOLIC BLOOD PRESSURE: 79 MMHG | HEART RATE: 85 BPM | SYSTOLIC BLOOD PRESSURE: 126 MMHG

## 2023-05-25 DIAGNOSIS — M25.512 LEFT SHOULDER PAIN: ICD-10-CM

## 2023-05-25 DIAGNOSIS — G54.0 NEUROGENIC THORACIC OUTLET SYNDROME: Primary | ICD-10-CM

## 2023-05-25 DIAGNOSIS — R20.2 PARESTHESIA OF ARM: ICD-10-CM

## 2023-05-25 PROCEDURE — G0463 HOSPITAL OUTPT CLINIC VISIT: HCPCS

## 2023-05-25 PROCEDURE — 99213 OFFICE O/P EST LOW 20 MIN: CPT | Performed by: SURGERY

## 2023-05-25 PROCEDURE — 99204 OFFICE O/P NEW MOD 45 MIN: CPT | Performed by: SURGERY

## 2023-05-25 NOTE — NURSING NOTE
Patient Education    Procedure: Left transaxillary first rib resection and scalenectomy  Diagnosis: Neurogenic TOS  Anticoagulation Instruction: n/a  Pre-Operative Physical Exam: You need to have a pre-op physical exam within 30 days of your procedure. Your procedure may be cancelled if you do not have a current History and Physical. Call your PCP's office to schedule.  Allergies:  Updated in Epic  Bowel Prep: n/a  NPO for solid 8 hours prior to arrival time.   NPO for clear liquids 2 hours prior to arrival time.   Post Procedure Education: [facility] post-procedure fact sheet reviewed with patient.    Showering instructions reviewed: Yes    Learner(s):patient  Method: Listening, Reading  Barriers to Learning:No Barrier  Outcome: Patient did verbalize understanding of above education.    Mini Anthony, CHANTELLEN, RN-[facility]

## 2023-05-25 NOTE — PROGRESS NOTES
[facility]        Patient is here for a consult to discuss TOS.    Pt is currently taking no meds that would impact our treatment plan.    /79 (BP Location: Right arm, Patient Position: Chair, Cuff Size: Adult Large)   Pulse 85     The provider has been notified that the patient has no concerns.     Questions patient would like addressed today are: N/A.    Refills are needed: N/A    Has homecare services and agency name:  Jocelyne Jorge MA

## 2023-05-26 ENCOUNTER — TELEPHONE (OUTPATIENT)
Dept: OTHER | Facility: CLINIC | Age: 28
End: 2023-05-26
Payer: COMMERCIAL

## 2023-05-26 NOTE — TELEPHONE ENCOUNTER
CASE REQUEST RECEIVED ON 5/25/23 FOR: LEFT TRANSAXILLARY FIRST RIB RESECTION AND SCALENECTOMY    CASE ID: [id number]

## 2023-05-30 NOTE — TELEPHONE ENCOUNTER
Patient is to call back around 2pm today.    Need to find out what dates to avoid for scheduling surgery.

## 2023-06-05 NOTE — TELEPHONE ENCOUNTER
Spoke with patient.  Informed him that Dr. Kramer does not do surgery on Mondays.  He does surgery on Tuesday, Wednesday, and Friday afternoons.    He robin prefer surgery on either Tuesday, 6/27/23, Wednesday, 6/28/23 or Friday, 6/30/23.    Informed him I will work on getting his surgery scheduled and will call him back.
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
## 2023-06-23 ENCOUNTER — OFFICE VISIT (OUTPATIENT)
Dept: FAMILY MEDICINE | Facility: CLINIC | Age: 28
End: 2023-06-23
Payer: COMMERCIAL

## 2023-06-23 ENCOUNTER — TRANSFERRED RECORDS (OUTPATIENT)
Dept: HEALTH INFORMATION MANAGEMENT | Facility: CLINIC | Age: 28
End: 2023-06-23

## 2023-06-23 ENCOUNTER — MYC MEDICAL ADVICE (OUTPATIENT)
Dept: FAMILY MEDICINE | Facility: CLINIC | Age: 28
End: 2023-06-23

## 2023-06-23 VITALS
HEART RATE: 97 BPM | RESPIRATION RATE: 16 BRPM | BODY MASS INDEX: 39.17 KG/M2 | WEIGHT: 315 LBS | OXYGEN SATURATION: 97 % | TEMPERATURE: 98.5 F | SYSTOLIC BLOOD PRESSURE: 134 MMHG | HEIGHT: 75 IN | DIASTOLIC BLOOD PRESSURE: 84 MMHG

## 2023-06-23 DIAGNOSIS — Z72.0 TOBACCO ABUSE: ICD-10-CM

## 2023-06-23 DIAGNOSIS — E66.01 MORBID OBESITY (H): ICD-10-CM

## 2023-06-23 DIAGNOSIS — Z72.0 CURRENT EVERY DAY NICOTINE VAPING: ICD-10-CM

## 2023-06-23 DIAGNOSIS — Z01.818 PREOP GENERAL PHYSICAL EXAM: Primary | ICD-10-CM

## 2023-06-23 DIAGNOSIS — G54.0 NEUROGENIC THORACIC OUTLET SYNDROME: ICD-10-CM

## 2023-06-23 DIAGNOSIS — D72.829 LEUKOCYTOSIS, UNSPECIFIED TYPE: Primary | ICD-10-CM

## 2023-06-23 LAB
ERYTHROCYTE [DISTWIDTH] IN BLOOD BY AUTOMATED COUNT: 12.1 % (ref 10–15)
HCT VFR BLD AUTO: 46.1 % (ref 40–53)
HGB BLD-MCNC: 16.1 G/DL (ref 13.3–17.7)
MCH RBC QN AUTO: 31.8 PG (ref 26.5–33)
MCHC RBC AUTO-ENTMCNC: 34.9 G/DL (ref 31.5–36.5)
MCV RBC AUTO: 91 FL (ref 78–100)
PLATELET # BLD AUTO: 210 10E3/UL (ref 150–450)
RBC # BLD AUTO: 5.07 10E6/UL (ref 4.4–5.9)
WBC # BLD AUTO: 13.5 10E3/UL (ref 4–11)

## 2023-06-23 PROCEDURE — 85027 COMPLETE CBC AUTOMATED: CPT | Performed by: STUDENT IN AN ORGANIZED HEALTH CARE EDUCATION/TRAINING PROGRAM

## 2023-06-23 PROCEDURE — 36415 COLL VENOUS BLD VENIPUNCTURE: CPT | Performed by: STUDENT IN AN ORGANIZED HEALTH CARE EDUCATION/TRAINING PROGRAM

## 2023-06-23 PROCEDURE — 93010 ELECTROCARDIOGRAM REPORT: CPT | Performed by: INTERNAL MEDICINE

## 2023-06-23 PROCEDURE — 93005 ELECTROCARDIOGRAM TRACING: CPT | Performed by: STUDENT IN AN ORGANIZED HEALTH CARE EDUCATION/TRAINING PROGRAM

## 2023-06-23 PROCEDURE — 99214 OFFICE O/P EST MOD 30 MIN: CPT | Performed by: STUDENT IN AN ORGANIZED HEALTH CARE EDUCATION/TRAINING PROGRAM

## 2023-06-23 PROCEDURE — 80048 BASIC METABOLIC PNL TOTAL CA: CPT | Performed by: STUDENT IN AN ORGANIZED HEALTH CARE EDUCATION/TRAINING PROGRAM

## 2023-06-23 ASSESSMENT — PAIN SCALES - GENERAL: PAINLEVEL: SEVERE PAIN (7)
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
## 2023-06-24 LAB
ANION GAP SERPL CALCULATED.3IONS-SCNC: 13 MMOL/L (ref 7–15)
BUN SERPL-MCNC: 13.2 MG/DL (ref 6–20)
CALCIUM SERPL-MCNC: 9.4 MG/DL (ref 8.6–10)
CHLORIDE SERPL-SCNC: 106 MMOL/L (ref 98–107)
CREAT SERPL-MCNC: 1 MG/DL (ref 0.67–1.17)
DEPRECATED HCO3 PLAS-SCNC: 24 MMOL/L (ref 22–29)
GFR SERPL CREATININE-BSD FRML MDRD: >90 ML/MIN/1.73M2
GLUCOSE SERPL-MCNC: 101 MG/DL (ref 70–99)
POTASSIUM SERPL-SCNC: 4.5 MMOL/L (ref 3.4–5.3)
SODIUM SERPL-SCNC: 143 MMOL/L (ref 136–145)

## 2023-06-26 LAB
ATRIAL RATE - MUSE: 87 BPM
DIASTOLIC BLOOD PRESSURE - MUSE: NORMAL MMHG
INTERPRETATION ECG - MUSE: NORMAL
P AXIS - MUSE: 46 DEGREES
PR INTERVAL - MUSE: 132 MS
QRS DURATION - MUSE: 108 MS
QT - MUSE: 376 MS
QTC - MUSE: 452 MS
R AXIS - MUSE: 38 DEGREES
SYSTOLIC BLOOD PRESSURE - MUSE: NORMAL MMHG
T AXIS - MUSE: 42 DEGREES
VENTRICULAR RATE- MUSE: 87 BPM

## 2023-06-27 ENCOUNTER — TELEPHONE (OUTPATIENT)
Dept: OTHER | Facility: CLINIC | Age: 28
End: 2023-06-27

## 2023-06-27 DIAGNOSIS — D72.829 LEUKOCYTOSIS, UNSPECIFIED TYPE: Primary | ICD-10-CM

## 2023-06-27 RX ORDER — ACETAMINOPHEN 500 MG
1-2 TABLET ORAL EVERY 6 HOURS PRN
COMMUNITY

## 2023-06-27 NOTE — TELEPHONE ENCOUNTER
[place] [facility]    I called Tyler Hughes about his left first rib resection tomorrow late morning.  He had no questions about the procedure.  He is aware he will have a drain postoperatively and will stay in the hospital for 1 to 2 days.    I requested he take 2 adult Tylenol's with a sip of water as he arrives in the hospital to help with postoperative discomfort.    Laboratory test from 6/23/2023 were unremarkable we did have a mild leukocytosis.  I discussed this with him.  He has no fever or any type of signs of infection.  This is of no clinical concern.     Stanley Kramer MD

## 2023-06-27 NOTE — PROGRESS NOTES
PTA medications updated by Medication Scribe prior to surgery via phone call with patient (last doses completed by Nurse)     Medication history sources: Patient, Surescripts and H&P  In the past week, patient estimated taking medication this percent of the time: Greater than 90%      Significant changes made to the medication list:  Patient reports no longer taking the following meds (med scribe removed from PTA med list): Cyclobenzaprine      Additional medication history information:   None    Medication reconciliation completed by provider prior to medication history? No    Time spent in this activity: 10 minutes    The information provided in this note is only as accurate as the sources available at the time of update(s)      Prior to Admission medications    Medication Sig Last Dose Taking? Auth Provider Long Term End Date   acetaminophen (TYLENOL) 500 MG tablet Take 1-2 tablets by mouth every 6 hours as needed for mild pain Unknown at prn Yes Reported, Patient         Medication history completed by:    Lorenzo Ram CPhT  Medication Scribe  [facility]

## 2023-06-28 ENCOUNTER — ANESTHESIA EVENT (OUTPATIENT)
Dept: SURGERY | Facility: CLINIC | Age: 28
End: 2023-06-28
Payer: COMMERCIAL

## 2023-06-28 ENCOUNTER — ANESTHESIA (OUTPATIENT)
Dept: SURGERY | Facility: CLINIC | Age: 28
End: 2023-06-28
Payer: COMMERCIAL

## 2023-06-28 ENCOUNTER — APPOINTMENT (OUTPATIENT)
Dept: SURGERY | Facility: PHYSICIAN GROUP | Age: 28
End: 2023-06-28
Payer: COMMERCIAL

## 2023-06-28 ENCOUNTER — HOSPITAL ENCOUNTER (INPATIENT)
Facility: CLINIC | Age: 28
LOS: 1 days | Discharge: HOME | End: 2023-06-29
Attending: SURGERY | Admitting: SURGERY
Payer: COMMERCIAL

## 2023-06-28 ENCOUNTER — APPOINTMENT (OUTPATIENT)
Dept: GENERAL RADIOLOGY | Facility: CLINIC | Age: 28
End: 2023-06-28
Attending: SURGERY
Payer: COMMERCIAL

## 2023-06-28 DIAGNOSIS — G54.0 TOS (THORACIC OUTLET SYNDROME): Primary | ICD-10-CM

## 2023-06-28 LAB — GLUCOSE BLDC GLUCOMTR-MCNC: 116 MG/DL (ref 70–99)

## 2023-06-28 PROCEDURE — 258N000003 HC RX IP 258 OP 636: Performed by: STUDENT IN AN ORGANIZED HEALTH CARE EDUCATION/TRAINING PROGRAM

## 2023-06-28 PROCEDURE — 21615 EXCISION 1ST &/CERVICAL RIB: CPT | Mod: 22 | Performed by: SURGERY

## 2023-06-28 PROCEDURE — 250N000011 HC RX IP 250 OP 636: Performed by: SURGERY

## 2023-06-28 PROCEDURE — 258N000003 HC RX IP 258 OP 636: Performed by: NURSE ANESTHETIST, CERTIFIED REGISTERED

## 2023-06-28 PROCEDURE — 999N000141 HC STATISTIC PRE-PROCEDURE NURSING ASSESSMENT: Performed by: SURGERY

## 2023-06-28 PROCEDURE — 999N000063 XR CHEST PORT 1 VIEW

## 2023-06-28 PROCEDURE — 360N000077 HC SURGERY LEVEL 4, PER MIN: Performed by: SURGERY

## 2023-06-28 PROCEDURE — 250N000013 HC RX MED GY IP 250 OP 250 PS 637: Performed by: SURGERY

## 2023-06-28 PROCEDURE — 0PB10ZZ EXCISION OF 1 TO 2 RIBS, OPEN APPROACH: ICD-10-PCS | Performed by: SURGERY

## 2023-06-28 PROCEDURE — 272N000001 HC OR GENERAL SUPPLY STERILE: Performed by: SURGERY

## 2023-06-28 PROCEDURE — 370N000017 HC ANESTHESIA TECHNICAL FEE, PER MIN: Performed by: SURGERY

## 2023-06-28 PROCEDURE — 250N000011 HC RX IP 250 OP 636: Performed by: NURSE ANESTHETIST, CERTIFIED REGISTERED

## 2023-06-28 PROCEDURE — 120N000001 HC R&B MED SURG/OB

## 2023-06-28 PROCEDURE — 250N000013 HC RX MED GY IP 250 OP 250 PS 637: Performed by: STUDENT IN AN ORGANIZED HEALTH CARE EDUCATION/TRAINING PROGRAM

## 2023-06-28 PROCEDURE — 250N000011 HC RX IP 250 OP 636: Mod: JZ | Performed by: ANESTHESIOLOGY

## 2023-06-28 PROCEDURE — 21700 REVISION OF NECK MUSCLE: CPT | Mod: LT | Performed by: SURGERY

## 2023-06-28 PROCEDURE — 250N000009 HC RX 250: Performed by: SURGERY

## 2023-06-28 PROCEDURE — 250N000025 HC SEVOFLURANE, PER MIN: Performed by: SURGERY

## 2023-06-28 PROCEDURE — 710N000009 HC RECOVERY PHASE 1, LEVEL 1, PER MIN: Performed by: SURGERY

## 2023-06-28 PROCEDURE — 250N000009 HC RX 250: Performed by: NURSE ANESTHETIST, CERTIFIED REGISTERED

## 2023-06-28 PROCEDURE — 93005 ELECTROCARDIOGRAM TRACING: CPT

## 2023-06-28 RX ORDER — MEPERIDINE HYDROCHLORIDE 25 MG/ML
12.5 INJECTION INTRAMUSCULAR; INTRAVENOUS; SUBCUTANEOUS EVERY 5 MIN PRN
Status: DISCONTINUED | OUTPATIENT
Start: 2023-06-28 | End: 2023-06-28 | Stop reason: HOSPADM

## 2023-06-28 RX ORDER — PROPOFOL 10 MG/ML
INJECTION, EMULSION INTRAVENOUS PRN
Status: DISCONTINUED | OUTPATIENT
Start: 2023-06-28 | End: 2023-06-28

## 2023-06-28 RX ORDER — FENTANYL CITRATE 50 UG/ML
50 INJECTION, SOLUTION INTRAMUSCULAR; INTRAVENOUS EVERY 5 MIN PRN
Status: DISCONTINUED | OUTPATIENT
Start: 2023-06-28 | End: 2023-06-28 | Stop reason: HOSPADM

## 2023-06-28 RX ORDER — FENTANYL CITRATE 50 UG/ML
25 INJECTION, SOLUTION INTRAMUSCULAR; INTRAVENOUS EVERY 5 MIN PRN
Status: DISCONTINUED | OUTPATIENT
Start: 2023-06-28 | End: 2023-06-28 | Stop reason: HOSPADM

## 2023-06-28 RX ORDER — KETOROLAC TROMETHAMINE 30 MG/ML
INJECTION, SOLUTION INTRAMUSCULAR; INTRAVENOUS PRN
Status: DISCONTINUED | OUTPATIENT
Start: 2023-06-28 | End: 2023-06-28

## 2023-06-28 RX ORDER — ONDANSETRON 4 MG/1
4 TABLET, ORALLY DISINTEGRATING ORAL EVERY 30 MIN PRN
Status: DISCONTINUED | OUTPATIENT
Start: 2023-06-28 | End: 2023-06-28 | Stop reason: HOSPADM

## 2023-06-28 RX ORDER — NALOXONE HYDROCHLORIDE 0.4 MG/ML
0.2 INJECTION, SOLUTION INTRAMUSCULAR; INTRAVENOUS; SUBCUTANEOUS
Status: DISCONTINUED | OUTPATIENT
Start: 2023-06-28 | End: 2023-06-29 | Stop reason: HOSPADM

## 2023-06-28 RX ORDER — SODIUM CHLORIDE, SODIUM LACTATE, POTASSIUM CHLORIDE, CALCIUM CHLORIDE 600; 310; 30; 20 MG/100ML; MG/100ML; MG/100ML; MG/100ML
INJECTION, SOLUTION INTRAVENOUS CONTINUOUS PRN
Status: DISCONTINUED | OUTPATIENT
Start: 2023-06-28 | End: 2023-06-28

## 2023-06-28 RX ORDER — SODIUM CHLORIDE, SODIUM LACTATE, POTASSIUM CHLORIDE, CALCIUM CHLORIDE 600; 310; 30; 20 MG/100ML; MG/100ML; MG/100ML; MG/100ML
INJECTION, SOLUTION INTRAVENOUS CONTINUOUS
Status: DISCONTINUED | OUTPATIENT
Start: 2023-06-28 | End: 2023-06-28 | Stop reason: HOSPADM

## 2023-06-28 RX ORDER — IBUPROFEN 600 MG/1
600 TABLET, FILM COATED ORAL 4 TIMES DAILY
Status: DISCONTINUED | OUTPATIENT
Start: 2023-06-28 | End: 2023-06-29 | Stop reason: HOSPADM

## 2023-06-28 RX ORDER — BUPIVACAINE HYDROCHLORIDE 5 MG/ML
INJECTION, SOLUTION PERINEURAL PRN
Status: DISCONTINUED | OUTPATIENT
Start: 2023-06-28 | End: 2023-06-28 | Stop reason: HOSPADM

## 2023-06-28 RX ORDER — PROPOFOL 10 MG/ML
INJECTION, EMULSION INTRAVENOUS CONTINUOUS PRN
Status: DISCONTINUED | OUTPATIENT
Start: 2023-06-28 | End: 2023-06-28

## 2023-06-28 RX ORDER — NALOXONE HYDROCHLORIDE 0.4 MG/ML
0.4 INJECTION, SOLUTION INTRAMUSCULAR; INTRAVENOUS; SUBCUTANEOUS
Status: DISCONTINUED | OUTPATIENT
Start: 2023-06-28 | End: 2023-06-29 | Stop reason: HOSPADM

## 2023-06-28 RX ORDER — SODIUM CHLORIDE, SODIUM LACTATE, POTASSIUM CHLORIDE, CALCIUM CHLORIDE 600; 310; 30; 20 MG/100ML; MG/100ML; MG/100ML; MG/100ML
INJECTION, SOLUTION INTRAVENOUS CONTINUOUS
Status: DISCONTINUED | OUTPATIENT
Start: 2023-06-28 | End: 2023-06-29 | Stop reason: HOSPADM

## 2023-06-28 RX ORDER — FENTANYL CITRATE 50 UG/ML
INJECTION, SOLUTION INTRAMUSCULAR; INTRAVENOUS PRN
Status: DISCONTINUED | OUTPATIENT
Start: 2023-06-28 | End: 2023-06-28

## 2023-06-28 RX ORDER — OXYCODONE HYDROCHLORIDE 5 MG/1
5 TABLET ORAL
Status: DISCONTINUED | OUTPATIENT
Start: 2023-06-28 | End: 2023-06-28 | Stop reason: HOSPADM

## 2023-06-28 RX ORDER — CEFAZOLIN SODIUM/WATER 3 G/30 ML
3 SYRINGE (ML) INTRAVENOUS
Status: COMPLETED | OUTPATIENT
Start: 2023-06-28 | End: 2023-06-28

## 2023-06-28 RX ORDER — HYDROMORPHONE HCL IN WATER/PF 6 MG/30 ML
0.2 PATIENT CONTROLLED ANALGESIA SYRINGE INTRAVENOUS EVERY 5 MIN PRN
Status: DISCONTINUED | OUTPATIENT
Start: 2023-06-28 | End: 2023-06-28 | Stop reason: HOSPADM

## 2023-06-28 RX ORDER — ONDANSETRON 2 MG/ML
INJECTION INTRAMUSCULAR; INTRAVENOUS PRN
Status: DISCONTINUED | OUTPATIENT
Start: 2023-06-28 | End: 2023-06-28

## 2023-06-28 RX ORDER — HYDRALAZINE HYDROCHLORIDE 20 MG/ML
2.5-5 INJECTION INTRAMUSCULAR; INTRAVENOUS
Status: DISCONTINUED | OUTPATIENT
Start: 2023-06-28 | End: 2023-06-28 | Stop reason: HOSPADM

## 2023-06-28 RX ORDER — LIDOCAINE HYDROCHLORIDE 20 MG/ML
INJECTION, SOLUTION INFILTRATION; PERINEURAL PRN
Status: DISCONTINUED | OUTPATIENT
Start: 2023-06-28 | End: 2023-06-28

## 2023-06-28 RX ORDER — OXYCODONE HYDROCHLORIDE 5 MG/1
10 TABLET ORAL EVERY 4 HOURS PRN
Status: DISCONTINUED | OUTPATIENT
Start: 2023-06-28 | End: 2023-06-29 | Stop reason: HOSPADM

## 2023-06-28 RX ORDER — METHOCARBAMOL 500 MG/1
500 TABLET, FILM COATED ORAL 4 TIMES DAILY PRN
COMMUNITY
End: 2023-07-13

## 2023-06-28 RX ORDER — DEXAMETHASONE SODIUM PHOSPHATE 4 MG/ML
INJECTION, SOLUTION INTRA-ARTICULAR; INTRALESIONAL; INTRAMUSCULAR; INTRAVENOUS; SOFT TISSUE PRN
Status: DISCONTINUED | OUTPATIENT
Start: 2023-06-28 | End: 2023-06-28

## 2023-06-28 RX ORDER — OXYCODONE HYDROCHLORIDE 5 MG/1
5 TABLET ORAL EVERY 4 HOURS PRN
Status: DISCONTINUED | OUTPATIENT
Start: 2023-06-28 | End: 2023-06-29 | Stop reason: HOSPADM

## 2023-06-28 RX ORDER — ONDANSETRON 2 MG/ML
4 INJECTION INTRAMUSCULAR; INTRAVENOUS EVERY 30 MIN PRN
Status: DISCONTINUED | OUTPATIENT
Start: 2023-06-28 | End: 2023-06-28 | Stop reason: HOSPADM

## 2023-06-28 RX ORDER — HYDROMORPHONE HCL IN WATER/PF 6 MG/30 ML
0.4 PATIENT CONTROLLED ANALGESIA SYRINGE INTRAVENOUS EVERY 5 MIN PRN
Status: DISCONTINUED | OUTPATIENT
Start: 2023-06-28 | End: 2023-06-28 | Stop reason: HOSPADM

## 2023-06-28 RX ORDER — METHOCARBAMOL 500 MG/1
500 TABLET, FILM COATED ORAL 4 TIMES DAILY PRN
Status: DISCONTINUED | OUTPATIENT
Start: 2023-06-28 | End: 2023-06-29 | Stop reason: HOSPADM

## 2023-06-28 RX ORDER — LIDOCAINE 40 MG/G
CREAM TOPICAL
Status: DISCONTINUED | OUTPATIENT
Start: 2023-06-28 | End: 2023-06-29 | Stop reason: HOSPADM

## 2023-06-28 RX ORDER — ACETAMINOPHEN 325 MG/1
975 TABLET ORAL 3 TIMES DAILY
Status: DISCONTINUED | OUTPATIENT
Start: 2023-06-28 | End: 2023-06-29 | Stop reason: HOSPADM

## 2023-06-28 RX ORDER — LABETALOL HYDROCHLORIDE 5 MG/ML
5 INJECTION, SOLUTION INTRAVENOUS
Status: DISCONTINUED | OUTPATIENT
Start: 2023-06-28 | End: 2023-06-28 | Stop reason: HOSPADM

## 2023-06-28 RX ORDER — DEXMEDETOMIDINE HYDROCHLORIDE 4 UG/ML
INJECTION, SOLUTION INTRAVENOUS PRN
Status: DISCONTINUED | OUTPATIENT
Start: 2023-06-28 | End: 2023-06-28

## 2023-06-28 RX ORDER — OXYCODONE HYDROCHLORIDE 5 MG/1
10 TABLET ORAL
Status: DISCONTINUED | OUTPATIENT
Start: 2023-06-28 | End: 2023-06-28 | Stop reason: HOSPADM

## 2023-06-28 RX ORDER — BUPIVACAINE HYDROCHLORIDE 5 MG/ML
INJECTION, SOLUTION EPIDURAL; INTRACAUDAL
Status: DISCONTINUED
Start: 2023-06-28 | End: 2023-06-28 | Stop reason: HOSPADM

## 2023-06-28 RX ADMIN — DEXMEDETOMIDINE HYDROCHLORIDE 8 MCG: 200 INJECTION INTRAVENOUS at 11:57

## 2023-06-28 RX ADMIN — IBUPROFEN 600 MG: 600 TABLET ORAL at 21:21

## 2023-06-28 RX ADMIN — SUCCINYLCHOLINE CHLORIDE 180 MG: 20 INJECTION, SOLUTION INTRAMUSCULAR; INTRAVENOUS; PARENTERAL at 11:21

## 2023-06-28 RX ADMIN — FENTANYL CITRATE 50 MCG: 50 INJECTION, SOLUTION INTRAMUSCULAR; INTRAVENOUS at 11:19

## 2023-06-28 RX ADMIN — OXYCODONE HYDROCHLORIDE 5 MG: 5 TABLET ORAL at 15:48

## 2023-06-28 RX ADMIN — PROPOFOL 250 MG: 10 INJECTION, EMULSION INTRAVENOUS at 11:21

## 2023-06-28 RX ADMIN — HYDROMORPHONE HYDROCHLORIDE 0.4 MG: 0.2 INJECTION, SOLUTION INTRAMUSCULAR; INTRAVENOUS; SUBCUTANEOUS at 14:24

## 2023-06-28 RX ADMIN — MIDAZOLAM 2 MG: 1 INJECTION INTRAMUSCULAR; INTRAVENOUS at 11:16

## 2023-06-28 RX ADMIN — OXYCODONE HYDROCHLORIDE 10 MG: 5 TABLET ORAL at 20:06

## 2023-06-28 RX ADMIN — ROCURONIUM BROMIDE 20 MG: 50 INJECTION, SOLUTION INTRAVENOUS at 12:03

## 2023-06-28 RX ADMIN — ROCURONIUM BROMIDE 15 MG: 50 INJECTION, SOLUTION INTRAVENOUS at 12:35

## 2023-06-28 RX ADMIN — KETOROLAC TROMETHAMINE 30 MG: 30 INJECTION, SOLUTION INTRAMUSCULAR at 12:37

## 2023-06-28 RX ADMIN — DEXAMETHASONE SODIUM PHOSPHATE 4 MG: 4 INJECTION, SOLUTION INTRA-ARTICULAR; INTRALESIONAL; INTRAMUSCULAR; INTRAVENOUS; SOFT TISSUE at 12:03

## 2023-06-28 RX ADMIN — ROCURONIUM BROMIDE 30 MG: 50 INJECTION, SOLUTION INTRAVENOUS at 11:27

## 2023-06-28 RX ADMIN — PROPOFOL 100 MG: 10 INJECTION, EMULSION INTRAVENOUS at 11:26

## 2023-06-28 RX ADMIN — ACETAMINOPHEN 975 MG: 325 TABLET, FILM COATED ORAL at 15:46

## 2023-06-28 RX ADMIN — SUGAMMADEX 400 MG: 100 INJECTION, SOLUTION INTRAVENOUS at 12:56

## 2023-06-28 RX ADMIN — DEXMEDETOMIDINE HYDROCHLORIDE 12 MCG: 200 INJECTION INTRAVENOUS at 11:50

## 2023-06-28 RX ADMIN — ACETAMINOPHEN 975 MG: 325 TABLET, FILM COATED ORAL at 21:20

## 2023-06-28 RX ADMIN — SODIUM CHLORIDE, POTASSIUM CHLORIDE, SODIUM LACTATE AND CALCIUM CHLORIDE: 600; 310; 30; 20 INJECTION, SOLUTION INTRAVENOUS at 15:49

## 2023-06-28 RX ADMIN — DEXMEDETOMIDINE HYDROCHLORIDE 8 MCG: 200 INJECTION INTRAVENOUS at 13:12

## 2023-06-28 RX ADMIN — ROCURONIUM BROMIDE 20 MG: 50 INJECTION, SOLUTION INTRAVENOUS at 12:15

## 2023-06-28 RX ADMIN — Medication 3 G: at 11:30

## 2023-06-28 RX ADMIN — HYDROMORPHONE HYDROCHLORIDE 0.4 MG: 0.2 INJECTION, SOLUTION INTRAMUSCULAR; INTRAVENOUS; SUBCUTANEOUS at 14:07

## 2023-06-28 RX ADMIN — FENTANYL CITRATE 50 MCG: 50 INJECTION, SOLUTION INTRAMUSCULAR; INTRAVENOUS at 11:21

## 2023-06-28 RX ADMIN — ONDANSETRON 4 MG: 2 INJECTION INTRAMUSCULAR; INTRAVENOUS at 11:21

## 2023-06-28 RX ADMIN — PROPOFOL 30 MCG/KG/MIN: 10 INJECTION, EMULSION INTRAVENOUS at 11:30

## 2023-06-28 RX ADMIN — SODIUM CHLORIDE, POTASSIUM CHLORIDE, SODIUM LACTATE AND CALCIUM CHLORIDE: 600; 310; 30; 20 INJECTION, SOLUTION INTRAVENOUS at 11:16

## 2023-06-28 RX ADMIN — HYDROMORPHONE HYDROCHLORIDE 0.5 MG: 1 INJECTION, SOLUTION INTRAMUSCULAR; INTRAVENOUS; SUBCUTANEOUS at 12:02

## 2023-06-28 RX ADMIN — LIDOCAINE HYDROCHLORIDE 80 MG: 20 INJECTION, SOLUTION INFILTRATION; PERINEURAL at 11:21

## 2023-06-28 RX ADMIN — FENTANYL CITRATE 50 MCG: 50 INJECTION, SOLUTION INTRAMUSCULAR; INTRAVENOUS at 13:52

## 2023-06-28 RX ADMIN — DEXMEDETOMIDINE HYDROCHLORIDE 12 MCG: 200 INJECTION INTRAVENOUS at 13:14

## 2023-06-28 ASSESSMENT — ACTIVITIES OF DAILY LIVING (ADL)
WEAR_GLASSES_OR_BLIND: NO
FALL_HISTORY_WITHIN_LAST_SIX_MONTHS: NO
TOILETING_ISSUES: NO
DOING_ERRANDS_INDEPENDENTLY_DIFFICULTY: NO
CHANGE_IN_FUNCTIONAL_STATUS_SINCE_ONSET_OF_CURRENT_ILLNESS/INJURY: NO
ADLS_ACUITY_SCORE: 20
ADLS_ACUITY_SCORE: 18
ADLS_ACUITY_SCORE: 20
CONCENTRATING,_REMEMBERING_OR_MAKING_DECISIONS_DIFFICULTY: NO
ADLS_ACUITY_SCORE: 35
DIFFICULTY_EATING/SWALLOWING: NO
ADLS_ACUITY_SCORE: 20
ADLS_ACUITY_SCORE: 18
DRESSING/BATHING_DIFFICULTY: NO
ADLS_ACUITY_SCORE: 20
ADLS_ACUITY_SCORE: 18
WALKING_OR_CLIMBING_STAIRS_DIFFICULTY: NO

## 2023-06-28 ASSESSMENT — COPD QUESTIONNAIRES: COPD: 0

## 2023-06-28 ASSESSMENT — LIFESTYLE VARIABLES: TOBACCO_USE: 1

## 2023-06-28 ASSESSMENT — ENCOUNTER SYMPTOMS
SEIZURES: 0
DYSRHYTHMIAS: 0

## 2023-06-28 NOTE — PROGRESS NOTES
POD #0 L transaxillary 1st rib resection and scalenectomy. A&Ox4. VSS on RA, capno WNL. IS encouraged. Given tylenol and oxycodone PRN for moderate pain. Good urine output. R PIV infusing LR @75 ml/hr. XANDER drain in place, left chest. Up SBA, ambulated halls x1, tolerated well. Regular diet. Discharge pending recovery.

## 2023-06-28 NOTE — ANESTHESIA CARE TRANSFER NOTE
Patient: Tyler Hughes    Procedure: Procedure(s):  LEFT TRANSAXILLARY FIRST RIB RESECTION AND SCALENECTOMY       Diagnosis: Neurogenic thoracic outlet syndrome [G54.0]  Diagnosis Additional Information: No value filed.    Anesthesia Type:   General     Note:    Oropharynx: oropharynx clear of all foreign objects and spontaneously breathing  Level of Consciousness: awake  Oxygen Supplementation: face mask  Level of Supplemental Oxygen (L/min / FiO2): 6  Independent Airway: airway patency satisfactory and stable  Dentition: dentition unchanged  Vital Signs Stable: post-procedure vital signs reviewed and stable  Report to RN Given: handoff report given  Patient transferred to: PACU  Comments: Neuromuscular blockade reversed with sugammadex, spontaneous respirations, adequate tidal volumes, followed commands to voice, oropharynx suctioned with soft flexible catheter, extubated atraumatically, extubated with suction, airway patent after extubation.  Oxygen via facemask at 6 liters per minute to PACU. Oxygen tubing connected to wall O2 in PACU, SpO2, NiBP, and EKG monitors and alarms on and functioning, Brian Hugger warmer connected to patient gown, report on patient's clinical status given to PACU RN, RN questions answered.     Handoff Report: Identifed the Patient, Identified the Reponsible Provider, Reviewed the pertinent medical history, Discussed the surgical course, Reviewed Intra-OP anesthesia mangement and issues during anesthesia, Set expectations for post-procedure period and Allowed opportunity for questions and acknowledgement of understanding      Vitals:  Vitals Value Taken Time   BP     Temp     Pulse 82 06/28/23 1313   Resp 25 06/28/23 1313   SpO2 89 % 06/28/23 1313   Vitals shown include unvalidated device data.    Electronically Signed By: ABIDA Paniagua CRNA  June 28, 2023  1:15 PM

## 2023-06-28 NOTE — PLAN OF CARE
Goal Outcome Evaluation:    POD#0 L transaxillary 1st rib resection & scalenectomy. VSS on RA, capno WNL. IS encouraged. C/o incisonal pain & L shoulder/neck pain, controlled with scheduled tylenol, ice & PRN oxycodone. C/o numbness & tingling to L arm, MD aware & improving per patient report. L chest/flank incision dressed w/ liquid bandage & CDI. L XANDER drain w/ bright/bloody drainage. BS active, no gas. Voiding adequately in urinal. LR @75ml/hr. Ambulated halls x1, SBA. Regular diet, tolerating w/ good intake. Discharge pending recovery.

## 2023-06-28 NOTE — ANESTHESIA PROCEDURE NOTES
Airway         Procedure Start/Stop Times: 6/28/2023 11:25 AM  Staff -        Anesthesiologist:  Hill Ramirez MD       CRNA: Diamond Worthington APRN CRNA       Other Anesthesia Staff: Sanjuana Gomez       Performed By: PAZ, CRNA and anesthesiologist  Consent for Airway        Urgency: elective  Indications and Patient Condition       Indications for airway management: tierney-procedural and airway protection       Induction type:intravenous       Mask difficulty assessment: 0 - not attempted    Final Airway Details       Final airway type: endotracheal airway       Successful airway: Oral and ETT - single  Endotracheal Airway Details        ETT size (mm): 8.0       Cuffed: yes       Successful intubation technique: video laryngoscopy       VL Blade Size: Glidescope 4       Grade View of Cords: 1       Adjucts: stylet       Position: Right       Measured from: lips       Secured at (cm): 24       Bite block used: None    Post intubation assessment        Placement verified by: capnometry, equal breath sounds and chest rise        Number of attempts at approach: 1       Number of other approaches attempted: 0       Secured with: pink tape       Ease of procedure: easy       Dentition: Intact and Unchanged    Medication(s) Administered   Medication Administration Time: 6/28/2023 11:25 AM

## 2023-06-28 NOTE — BRIEF OP NOTE
[facility]    Brief Operative Note    Pre-operative diagnosis: Neurogenic thoracic outlet syndrome [G54.0]  Post-operative diagnosis Same as pre-operative diagnosis    Procedure: Procedure(s):  LEFT TRANSAXILLARY FIRST RIB RESECTION AND SCALENECTOMY  Surgeon: Surgeon(s) and Role:     * Stanley Kramer MD - Primary     Anesthesia: General   Estimated Blood Loss: 25cc    Drains: None  Specimens: * No specimens in log *  Findings:  See op note.  Complications: None.  Implants: * No implants in log *      CXR in PACU

## 2023-06-28 NOTE — ANESTHESIA POSTPROCEDURE EVALUATION
Patient: Tyler Hughes    Procedure: Procedure(s):  LEFT TRANSAXILLARY FIRST RIB RESECTION AND SCALENECTOMY       Anesthesia Type:  General    Note:     Postop Pain Control: Uneventful            Sign Out: Well controlled pain   PONV:    Neuro/Psych: Uneventful            Sign Out: Acceptable/Baseline neuro status   Airway/Respiratory: Uneventful            Sign Out: Acceptable/Baseline resp. status   CV/Hemodynamics: Uneventful            Sign Out: Acceptable CV status; No obvious hypovolemia; No obvious fluid overload   Other NRE:    DID A NON-ROUTINE EVENT OCCUR?            Last vitals:  Vitals Value Taken Time   /71 06/28/23 1430   Temp 36.1  C (96.9  F) 06/28/23 1315   Pulse 64 06/28/23 1445   Resp 24 06/28/23 1445   SpO2 92 % 06/28/23 1445   Vitals shown include unvalidated device data.    Electronically Signed By: Hill Ramirez MD  June 28, 2023  2:46 PM

## 2023-06-28 NOTE — OP NOTE
Procedure Date: 06/28/2023    PREOPERATIVE DIAGNOSES:    1.  Symptomatic neurogenic left thoracic outlet syndrome.  2.  Morbid obesity.    POSTOPERATIVE DIAGNOSES:    1.  Symptomatic neurogenic left thoracic outlet syndrome.    2.  Morbid obesity.    PROCEDURE:  Left transaxillary first rib resection and scalenectomy (D-10).    ANESTHESIA:  General.    PREOPERATIVE MEDICATIONS:  Ancef 3 g IV.    OPERATING SURGEON:  Stanley Kramer MD    FIRST ASSISTANT:    1.  Dr. Nas Parikh MD (Vascular Fellow).  2.  Pamela Johnson, CST, CFA.    INDICATIONS:  A 28-year-old patient who has bilateral left greater than right neurogenic thoracic outlet syndrome.  He has failed all conservative treatment.  We recommended initially on his more symptomatic left side a transaxillary first rib resection and scalenectomy with mobilization of the brachial plexus.  Risks and benefits were reviewed again with he and his wife this morning.    He is a very large man with a BMI of 49 kg/m2 and a weight of 177.4 kilograms.    DESCRIPTION OF PROCEDURE:  The patient was brought to the operating room, induced under general anesthesia well and without difficulty.  Calf pneumatic compression boots were placed.  He was placed in a partial left lateral thoracotomy position with appropriate padding.  The left chest wall/axilla and arm were prepped and draped.  Timeout was called and the sites were identified.    A low transverse axillary incision was made.  Dissection was carried with electrocautery.  We then dissected down to the chest wall.  There was a crossing vein, which was ligated and divided between 3-0 silk suture.  With use of loupe magnification and light source and retraction, we were able to identify the first rib, which was very deep in this very large patient.    The subclavian vein and artery were completely unremarkable.  There was a small branch coming off the artery towards the first rib and this was divided between 3-0 silk  suture and metal clips with complete mobilization and excellent hemostasis.  We easily identified the lower portion of the brachial plexus and this was retracted very gently for the rest of the procedure.    Electrocautery was used to free up the inferior border of the first rib medially to laterally.  The middle scalene muscle was taken immediately off the rib with electrocautery to its posterior attachments with some retraction of the lower brachial plexus, but good hemostasis.  We partially divided the lateral portion of the subclavius tendon, but not the entire tendon in this large patient.  The anterior scalene muscle was identified.  A Bolton retractor was used to go under the rib medially and laterally and a right angle clamp went under the anterior scalene muscle and this was divided with electrocautery and allowed to retract with excellent hemostasis.    A 45-degree  was used to cut the posterior aspect of the rib and a regular , the anterior portion of the rib was removed.  With the aid of further retraction, a Leksell rongeur was used to remove the posterior aspect of the rib until we were very close to its attachment to the spine.  Electrocautery was used for hemostasis in the bone marrow.  Anteriorly, we also removed the medial portion, but not to the sternum with a Leksell rongeur.    We inspected the field.  Neurovascular structures including the lower brachial plexus were completely free of any extrinsic obstruction.  Pleura was intact.  Then, a #19 fluted drain was brought through an inferior axillary stab incision and placed high within the axilla.  The wound was closed with interrupted 2-0 and 3-0 Vicryl suture in layers and the skin with 4-0 Monocryl in subcuticular fashion followed by surgical adhesive and gauze to the drain.    The patient tolerated the procedure well.    ESTIMATED BLOOD LOSS:  Less than 10 mL.    COMPLICATIONS:  None.    The procedure was much more difficult  due to the patient's very large size.  OBIE Carrolllog , CST, CFA assistance was required for arm retraction, which was crucial for safe completion of the procedure on this very large patient.    Stanley Kramer MD        D: 2023   T: 2023   MT: el    Name:     MIGUEL ÁNGEL VALERIO  MRN:      [id number]        Account:        [id number]   :      1995           Procedure Date: 2023     Document: [id number]

## 2023-06-29 VITALS
RESPIRATION RATE: 16 BRPM | SYSTOLIC BLOOD PRESSURE: 123 MMHG | WEIGHT: 315 LBS | TEMPERATURE: 98.9 F | BODY MASS INDEX: 39.17 KG/M2 | DIASTOLIC BLOOD PRESSURE: 69 MMHG | HEART RATE: 65 BPM | HEIGHT: 75 IN | OXYGEN SATURATION: 97 %

## 2023-06-29 LAB — GLUCOSE BLDC GLUCOMTR-MCNC: 103 MG/DL (ref 70–99)

## 2023-06-29 PROCEDURE — 258N000003 HC RX IP 258 OP 636: Performed by: STUDENT IN AN ORGANIZED HEALTH CARE EDUCATION/TRAINING PROGRAM

## 2023-06-29 PROCEDURE — 250N000013 HC RX MED GY IP 250 OP 250 PS 637: Performed by: SURGERY

## 2023-06-29 PROCEDURE — 250N000013 HC RX MED GY IP 250 OP 250 PS 637: Performed by: STUDENT IN AN ORGANIZED HEALTH CARE EDUCATION/TRAINING PROGRAM

## 2023-06-29 RX ORDER — IBUPROFEN 600 MG/1
600 TABLET, FILM COATED ORAL 4 TIMES DAILY
COMMUNITY
Start: 2023-06-29

## 2023-06-29 RX ORDER — SENNA AND DOCUSATE SODIUM 50; 8.6 MG/1; MG/1
1 TABLET, FILM COATED ORAL 2 TIMES DAILY PRN
Qty: 50 TABLET | Refills: 0 | Status: SHIPPED | OUTPATIENT
Start: 2023-06-29 | End: 2023-10-12

## 2023-06-29 RX ORDER — OXYCODONE HYDROCHLORIDE 5 MG/1
5 TABLET ORAL EVERY 4 HOURS PRN
Qty: 20 TABLET | Refills: 0 | Status: SHIPPED | OUTPATIENT
Start: 2023-06-29 | End: 2023-07-13

## 2023-06-29 RX ADMIN — ACETAMINOPHEN 975 MG: 325 TABLET, FILM COATED ORAL at 08:37

## 2023-06-29 RX ADMIN — IBUPROFEN 600 MG: 600 TABLET ORAL at 08:37

## 2023-06-29 RX ADMIN — OXYCODONE HYDROCHLORIDE 10 MG: 5 TABLET ORAL at 10:04

## 2023-06-29 RX ADMIN — OXYCODONE HYDROCHLORIDE 5 MG: 5 TABLET ORAL at 03:10

## 2023-06-29 RX ADMIN — SODIUM CHLORIDE, POTASSIUM CHLORIDE, SODIUM LACTATE AND CALCIUM CHLORIDE: 600; 310; 30; 20 INJECTION, SOLUTION INTRAVENOUS at 03:08

## 2023-06-29 ASSESSMENT — ACTIVITIES OF DAILY LIVING (ADL)
ADLS_ACUITY_SCORE: 20

## 2023-06-29 NOTE — DISCHARGE INSTRUCTIONS
You have a XANDER drain in place to drain fluid from inflammation after surgery. Keep the bulb secured to clothing so that it does not pull. Keep a gauze dressing around the insertion site and change 1-2 times per day and/or as needed when it becomes saturated with drainage. Empty the bulb into a toilet when it is nearly full or when it is full enough to pull on the site, then return it to suction. There is no need to measure the amount of drainage, though you should notice that the amount of drainage decreases over time. You should keep the site where the drain goes into your body free of outside fluids (water/sweat/etc going into your body through that site could cause an infection), so cover with saran wrap if you take a shower and do not submerge in water (no baths, swimming, etc). If the amount of drainage increases significantly or if the color changes to pure blood or white/yellow with foul smelling pus, please call the office.

## 2023-06-29 NOTE — PROGRESS NOTES
Vascular Surgery Progress Note:  POD #1  Left TOS    S: Overall doing well.  Pain well controlled with medications.  Does have tingling and numbness along the ulnar nerve distribution but motor function is normal.    O:   Vitals:  BP  Min: 103/56  Max: 138/80  Temp  Av.4  F (36.3  C)  Min: 96.8  F (36  C)  Max: 98.2  F (36.8  C)  Pulse  Av.5  Min: 64  Max: 95  I/O last 3 completed shifts:  In: 500 [P.O.:500]  Out: 3620 [Urine:3550; Drains:70]    Physical Exam: Alert and appropriate.  Comfortable.   Conversant.   Moving left arm easily   Normal CMS except for her numbness along the ulnar nerve distribution (due to retraction of the lower brachial plexus for surgery and will improve with time and this was discussed)    XANDER with serosanguineous output that is very clear today.  Approximately 30 mL output during the night.    Assessment/Plan: Doing very well following left TOS surgery.  Numbness along the ulnar nerve is not unexpected due to traction on the volar brachial plexus and discussed with patient.  Will discharge to home with his wife this morning.  We will plan to leave drain in place.  We will check with him tomorrow to see whether drain can be removed      Wm. Nia MD

## 2023-06-29 NOTE — PLAN OF CARE
Goal Outcome Evaluation:    POD 1 of a L transaxillary 1st rib resection & scalenectomy. VSS on RA. Pain controlled w/ scheduled Tylenol, Ibuprofen, and PRN Oxy. Denies N/V. PIV SL for discharge. Noted still numbness & tingling to L arm this shift & MD aware. L chest/flank incision dressed with liquid bandage & site WNL. XANDER drain in place w/ moderate bright/bloody drainage, incision CDI. Discharge instructions completed with patient & wife regarding XANDER care, both verbalized understanding. Up independently, voiding adequately. BS active, passing gas. Tolerating diet w/ good intake. Plan for discharge home today w/ XANDER drain, Dr. Kramer will follow-up with patient tomorrow regarding removal.

## 2023-06-29 NOTE — PLAN OF CARE
POD 1 of a L transaxillary 1st rib resection & scalenectomy. VSS on RA. Pain controlled w/ scheduled Tylenol, Ibuprofen, and PRN Oxy. Denies N/V. PIV infusing LR @ 75ml/hr. Noted still numbness & tingling to L arm this shift - MD ware - pt squeezing ball and moving L arm to help. L chest incision dressings are C/D/I. XANDER drain in place w/ minimal bright/bloody drainage. Up SBA, voiding adequately via urinal, active bowel sounds, no BM, passing gas per pt. Tolerating diet. Discharge pending.

## 2023-06-29 NOTE — PROGRESS NOTES
VASCULAR SURGERY    Doing well.  Comfortable.    CMS=normal.  Minimal drain output.    PACU CXR=ELIAZAR Kramer MD

## 2023-06-29 NOTE — PROGRESS NOTES
Patient discharged at 10:53 AM to home.  IV was discontinued. Pain at time of discharge was controlled with discharge medications. Belongings returned to patient.  Discharge instructions and medications reviewed with patient.  Patient verbalized understanding and all questions were answered.  Prescriptions given to patient.  At time of discharge, patient condition was stable and left the unit walking escorted by family.

## 2023-06-30 LAB
ATRIAL RATE - MUSE: 79 BPM
DIASTOLIC BLOOD PRESSURE - MUSE: NORMAL MMHG
INTERPRETATION ECG - MUSE: NORMAL
P AXIS - MUSE: 33 DEGREES
PR INTERVAL - MUSE: 142 MS
QRS DURATION - MUSE: 108 MS
QT - MUSE: 386 MS
QTC - MUSE: 442 MS
R AXIS - MUSE: 63 DEGREES
SYSTOLIC BLOOD PRESSURE - MUSE: NORMAL MMHG
T AXIS - MUSE: 50 DEGREES
VENTRICULAR RATE- MUSE: 79 BPM

## 2023-07-01 ENCOUNTER — TELEPHONE (OUTPATIENT)
Dept: NURSING | Facility: CLINIC | Age: 28
End: 2023-07-01
Payer: COMMERCIAL

## 2023-07-01 NOTE — TELEPHONE ENCOUNTER
Pt calling with question about;    XANDER drain after surgery of 6/28/23  Pt wondering if it is ok to remove this drain today.  Under 20 mL since yesterday  Now having some clots in tube   Very slight amount of drainage in last 24 hours  Has not had to take oxycodone only       Page to [facility] Provider, Dr. Johnson who advised;    Should be fine to remove drain today as long as there has been only minimal drainage.  Clots will reabsorb.    Pt is notified, verbalized understanding and agrees with this plan of care.    Cathie Ma RN, Nurse Advisor 11:11 AM 7/1/2023

## 2023-07-03 ENCOUNTER — TELEPHONE (OUTPATIENT)
Dept: OTHER | Facility: CLINIC | Age: 28
End: 2023-07-03
Payer: COMMERCIAL

## 2023-07-03 NOTE — TELEPHONE ENCOUNTER
[place] [facility]      I spoke with Tyler Hughes yesterday.  Underwent neurogenic left TOS surgery on 6/28/2023.  Discharged POD#1 with Keo drain.  Spoke with him the following day and he was doing well but there was still ongoing drainage.    Drainage significantly improved and actually remove the drain on 7/1/2023.  Slightly sore but otherwise doing well.    We will follow-up in the clinics.  At that time we will discuss similar surgery on the right side.      Stanley Kramer MD

## 2023-07-06 NOTE — DISCHARGE SUMMARY
Vascular Surgery Discharge Summary    NAME: Tyler Hughes   MRN: [id number]   : 1995     DATE OF ADMISSION: 2023     PRE/POSTOPERATIVE DIAGNOSES:  1.  Symptomatic neurogenic left thoracic outlet syndrome.  2.  Morbid obesity      PROCEDURES PERFORMED, 2023: Left transaxillary first rib resection and scalenectomy (D-10).    INTRAOPERATIVE FINDINGS: None     POSTOPERATIVE COMPLICATIONS: None     DATE OF DISCHARGE: 2023     HOSPITAL COURSE: Tyler Hughes is a 28 year old male who on 2023 underwent the above-named procedures.  He tolerated the procedure well and postoperatively was transferred to the general post-surgical unit.  The remainder of his course was essentiallly uncomplicated.  Prior to discharge, his pain was controlled well with oxycodone and acetaminophen. He was able to perform ADLs and ambulate independently without difficulty, and had full return of bowel and bladder function.  On 2023, he was discharged to home in stable condition.    DISCHARGE INSTRUCTIONS: Copy from S    FOLLOW UP APPOINTMENTS: Copy from S    DISCHARGE MEDICATIONS:     Review of your medicines      START taking      Dose / Directions   ibuprofen 600 MG tablet  Commonly known as: ADVIL/MOTRIN  Used for: TOS (thoracic outlet syndrome)      Dose: 600 mg  Take 1 tablet (600 mg) by mouth 4 times daily  Refills: 0     oxyCODONE 5 MG tablet  Commonly known as: ROXICODONE  Used for: TOS (thoracic outlet syndrome)      Dose: 5 mg  Take 1 tablet (5 mg) by mouth every 4 hours as needed for moderate pain  Quantity: 20 tablet  Refills: 0     SENNA-docusate sodium 8.6-50 MG tablet  Commonly known as: SENNA S  Used for: TOS (thoracic outlet syndrome)      Dose: 1 tablet  Take 1 tablet by mouth 2 times daily as needed  Quantity: 50 tablet  Refills: 0        CONTINUE these medicines which have NOT CHANGED      Dose / Directions   acetaminophen 500 MG tablet  Commonly known as: TYLENOL      Dose: 1-2 tablet  Take  1-2 tablets by mouth every 6 hours as needed for mild pain  Refills: 0     methocarbamol 500 MG tablet  Commonly known as: ROBAXIN      Dose: 500 mg  Take 500 mg by mouth 4 times daily as needed for muscle spasms  Refills: 0           Where to get your medicines      These medications were sent to [facility] - [address] [place] [address]  [address] [address], [place] [address]    Phone: [phone]     oxyCODONE 5 MG tablet    SENNA-docusate sodium 8.6-50 MG tablet     Some of these will need a paper prescription and others can be bought over the counter. Ask your nurse if you have questions.    You don't need a prescription for these medications    ibuprofen 600 MG tablet

## 2023-07-12 NOTE — PROGRESS NOTES
[place] [facility]    Tyler Hughes returns for follow-up.  He has significant bilateral symptomatic neurogenic TOS.  Underwent a left transaxillary first rib resection scalenectomy on 6/28/2023.  Somewhat more difficult due to his body habitus with a BMI of 49 kg per metered square but did well.  Drain was removed at home on 7/1/2023.    He returns for follow-up today.  He will eventually require a similar procedure on the right side.    He reports that he is done very well since his surgery.  He is referred cervical and head pain is completely resolved.  Good range of motion of his left arm.  Still some tingling and numbness to the ulnar nerve distribution of his forearm and fifth-fourth digits.  Strength is relatively normal.    Exam: Alert and appropriate.  Very comfortable.  Pleasant.   Blood pressure 121/86 left arm.  Pulse 96.   Well-healing left axillary incision.  No arm swelling.   Normal motor strength including ulnar distribution   Some numbness along the forearm and hand ulnar nerve and axilla      IMPRESSION: #1.  Doing very well following vasogenic left TOS surgery.  Marked improvement of his overall symptoms.  Does have some numbness associated traction of the lower brachial plexus that may take some time to resolve and this was discussed today and previously.     #2.  Had similar neurogenic symptoms on the right.  However, the present time this is much less than the treated left side.  Presently has no plans for surgery and this will be discussed later.    No specific restrictions to activities.    We will plan to see us again in 3 months for clinical follow-up and rediscuss right arm whether surgery is indicated.    Stanley Kramer MD   This note was created using Dragon voice recognition software which may result in transcription errors.

## 2023-07-13 ENCOUNTER — OFFICE VISIT (OUTPATIENT)
Dept: OTHER | Facility: CLINIC | Age: 28
End: 2023-07-13
Attending: SURGERY
Payer: COMMERCIAL

## 2023-07-13 VITALS — SYSTOLIC BLOOD PRESSURE: 121 MMHG | DIASTOLIC BLOOD PRESSURE: 86 MMHG | HEART RATE: 96 BPM

## 2023-07-13 DIAGNOSIS — G54.0 NEUROGENIC THORACIC OUTLET SYNDROME OF LEFT BRACHIAL PLEXUS: Primary | ICD-10-CM

## 2023-07-13 PROCEDURE — 99024 POSTOP FOLLOW-UP VISIT: CPT | Performed by: SURGERY

## 2023-07-13 PROCEDURE — G0463 HOSPITAL OUTPT CLINIC VISIT: HCPCS

## 2023-07-13 PROCEDURE — 99213 OFFICE O/P EST LOW 20 MIN: CPT | Performed by: SURGERY

## 2023-07-13 NOTE — PROGRESS NOTES
[facility]        Patient is here for a  follow up.     Pt is currently taking no meds that would impact our treatment plan.    /86 (BP Location: Left arm, Patient Position: Chair, Cuff Size: Adult Regular)   Pulse 96     The provider has been notified that the patient has no concerns.     Questions patient would like addressed today are: N/A.    Refills are needed: N/A    Has homecare services and agency name:  Jocelyne Jorge MA

## 2023-08-05 ENCOUNTER — HEALTH MAINTENANCE LETTER (OUTPATIENT)
Age: 28
End: 2023-08-05

## 2023-10-11 NOTE — PROGRESS NOTES
"[place] [facility]    Tyler Hughes is a history of ongoing severe disabling neurogenic TOS more prominent on the left than right side.  Had failed all conservative treatment.  Underwent a left transaxillary first rib resection scalenectomy on 6/28/2023.  Doing well on the operative side on follow-up on 7/13/2023 with marked improvement of his symptoms.    We discussed eventual surgery on his right side.  However, at his last follow-up with the marked improvement of the operative side he wanted to wait schedule this procedure.  He comes today to discuss this further.    While he has had a marked improvement of his left arm since surgery.  He no longer awakes with a \"dead arm\".  However, he still has numbness along the upper posterior arm along the ulnar distribution into his shoulder.  Forearm and hand are not an issue.  He will get occasional zingers for no reason along this distribution that can last up to 60 to 90 seconds.  If he moves and pushes his arm he can somewhat make these resolve.  Since they can come and go at any time they can be quite irritating.    He has had no problems with the axillary incision.  Left arm to him is much better even with these issues that we discussed at length today.  He does not feel that his right arm is to the point where he would consider similar surgery presently.    Exam: Alert and appropriate.  Very comfortable.  Quite conversant.   Blood pressure 135/83 right.   Well-healed left axillary incision.  Motor strength is normal.  No swelling.    I did show him the postoperative chest x-ray with near complete removal of the first rib.    More recently he has been using herbal teas instead of medications which she has found helpful.  He does weigh 391 pound and feels as if he is at least 90 pounds overweight if not more and trying to lose weight also which is overall helpful for his general health.      He mentioned that prior to surgery he had tried gabapentin for " the nerve pain and had significant side effects.  Had used Flexeril following surgery for short period of time that helped his muscle spasms which have been chronic prior to surgery also.  No longer using these medications.  Not interested in trying Lyrica due to his issues with gabapentin.    Will occasionally take a Tylenol or Advil for discomfort.    IMPRESSION: Overall improvement following neurogenic left TOS surgery.  Still some issues on the sensory portion of the ulnar nerve due to the extensive traction necessary for his body habitus to remove the posterior aspect of the rib.  This should gradually improve with time and we discussed this today at length.    As mentioned he is not interested in similar surgery on the right arm at this time and will contact me if he reaches the time when he feels this would be necessary.    20 minutes with me today.  We will follow-up as needed.    Stanley Kramer MD   This note was created using Dragon voice recognition software which may result in transcription errors.

## 2023-10-12 ENCOUNTER — OFFICE VISIT (OUTPATIENT)
Dept: OTHER | Facility: CLINIC | Age: 28
End: 2023-10-12
Attending: SURGERY
Payer: COMMERCIAL

## 2023-10-12 VITALS — DIASTOLIC BLOOD PRESSURE: 83 MMHG | SYSTOLIC BLOOD PRESSURE: 135 MMHG | OXYGEN SATURATION: 96 % | HEART RATE: 101 BPM

## 2023-10-12 DIAGNOSIS — G54.0 THORACIC OUTLET SYNDROME: Primary | ICD-10-CM

## 2023-10-12 PROCEDURE — 99213 OFFICE O/P EST LOW 20 MIN: CPT | Performed by: SURGERY

## 2024-01-21 ENCOUNTER — HOSPITAL ENCOUNTER (EMERGENCY)
Facility: HOSPITAL | Age: 29
Discharge: HOME | End: 2024-01-21
Payer: COMMERCIAL

## 2024-01-21 ENCOUNTER — APPOINTMENT (OUTPATIENT)
Dept: RADIOLOGY | Facility: HOSPITAL | Age: 29
End: 2024-01-21
Payer: COMMERCIAL

## 2024-01-21 VITALS
SYSTOLIC BLOOD PRESSURE: 150 MMHG | RESPIRATION RATE: 16 BRPM | BODY MASS INDEX: 48.12 KG/M2 | OXYGEN SATURATION: 94 % | TEMPERATURE: 97.9 F | WEIGHT: 315 LBS | HEART RATE: 98 BPM | DIASTOLIC BLOOD PRESSURE: 85 MMHG

## 2024-01-21 DIAGNOSIS — M79.5 FOREIGN BODY (FB) IN SOFT TISSUE: ICD-10-CM

## 2024-01-21 DIAGNOSIS — Z75.8 DOES NOT HAVE PRIMARY CARE PROVIDER: ICD-10-CM

## 2024-01-21 PROCEDURE — 64450 NJX AA&/STRD OTHER PN/BRANCH: CPT

## 2024-01-21 PROCEDURE — 250N000009 HC RX 250

## 2024-01-21 PROCEDURE — 250N000013 HC RX MED GY IP 250 OP 250 PS 637

## 2024-01-21 PROCEDURE — 73630 X-RAY EXAM OF FOOT: CPT | Mod: RT

## 2024-01-21 PROCEDURE — 99283 EMERGENCY DEPT VISIT LOW MDM: CPT

## 2024-01-21 RX ORDER — ACETAMINOPHEN 325 MG/1
650 TABLET ORAL ONCE
Status: COMPLETED | OUTPATIENT
Start: 2024-01-21 | End: 2024-01-21

## 2024-01-21 RX ORDER — GINSENG 100 MG
CAPSULE ORAL ONCE
Status: COMPLETED | OUTPATIENT
Start: 2024-01-21 | End: 2024-01-21

## 2024-01-21 RX ORDER — GINSENG 100 MG
CAPSULE ORAL ONCE
Status: DISCONTINUED | OUTPATIENT
Start: 2024-01-21 | End: 2024-01-22 | Stop reason: HOSPADM

## 2024-01-21 RX ADMIN — BACITRACIN: 500 OINTMENT TOPICAL at 22:14

## 2024-01-21 RX ADMIN — ACETAMINOPHEN 650 MG: 325 TABLET ORAL at 21:29

## 2024-01-21 ASSESSMENT — ACTIVITIES OF DAILY LIVING (ADL): ADLS_ACUITY_SCORE: 33

## 2024-01-22 NOTE — ED TRIAGE NOTES
"Pt states 20 minutes pta he accidentally stepped on a miniature toy motorcycle which punctured his right heel, and is still stuck in his heel. Pt rates pain a \"6\" out of 10. Last tetanus immunization was 10+ years ago.     Triage Assessment (Adult)       Row Name 01/21/24 1848          Triage Assessment    Airway WDL WDL        Respiratory WDL    Respiratory WDL WDL        Skin Circulation/Temperature WDL    Skin Circulation/Temperature WDL WDL        Cardiac WDL    Cardiac WDL WDL        Peripheral/Neurovascular WDL    Peripheral Neurovascular WDL WDL        Cognitive/Neuro/Behavioral WDL    Cognitive/Neuro/Behavioral WDL WDL                     "

## 2024-01-22 NOTE — DISCHARGE INSTRUCTIONS
You were seen in the emergency department tonight for your foreign body in your right foot.  This was successfully removed.  Please apply bacitracin ointment to the area twice a day every day for the next week.  Please leave the dressing replaced on for the next 24 hours.  Do not shower for the next 24 hours.  Please follow-up with [facility] or orthopedic clinic of your choice for close recheck.  I placed a referral to primary care, please also establish with them for close recheck.  Return to the emergency department if you develop any new or worsening symptoms including but not limited to fevers, chills, pus draining from wound, streaking redness, swelling, increased pain.

## 2024-01-22 NOTE — ED PROVIDER NOTES
EMERGENCY DEPARTMENT ENCOUNTER      NAME: Tyler Hughes  AGE: 28 year old male  YOB: 1995  MRN: [id number]  EVALUATION DATE & TIME: 01/21/24 7:16 PM    PCP: No Ref-Primary, Physician    ED PROVIDER: Magda Cleaning PA-C      CHIEF COMPLAINT:  Right foot injury      FINAL IMPRESSION:  1. Foreign body (FB) in soft tissue    2. Does not have primary care provider          ED COURSE & MEDICAL DECISION MAKING:  Pertinent Labs & Imaging studies reviewed. (See chart for details)    The patient is an otherwise healthy 28 year old male presenting to the emergency department for evaluation of right foot injury sustained when he accidentally stepped on his child's toy motorcycle while walking barefoot in his home prior to arrival. Attempts to remove the foreign body at home were unsuccessful. No fevers, purulent drainage, increased warmth, streaking erythema.     Initial vital signs reviewed and significant for elevated blood pressure, viral signs otherwise within normal limits. Patient is afebrile. On exam, the patient is clinically well appearing and non toxic appearing resting comfortably in exam chair in no acute distress. He has a toy motorcycle embedded in the plantar surface of his right heel. No surrounding edema, streaking erythema, purulent drainage, or increased warmth. Bleeding is controlled. No tenderness to palpation overlying bony structures of right foot. Distal CMS is intact.     Considered a broad differential including foreign body, bony injury, open fracture, tendon or ligamentous injury. Last Tetanus was in March 2019, therefore Tetanus not updated today. Although have relatively low suspicion for bony injury or dislocation, cannot definitively rule this out therefore X-ray was obtained which did not show acute fracture or joint malalignment. Exam not suggestive of surrounding cellulitis or more sinister infection. Distal CMS intact on exam. Discussed options for workup and  management with the patient. We have agreed on foreign body removal.     I administered local anesthetic with excellent effect. The foreign body was successfully removed and the patient tolerated the procedure well. Please see procedure note for details. Wound cleaned thoroughly and antibiotic ointment applied by RN. The patient is not immunocompromised with no known history of diabetes therefore as he is afebrile with no exam findings suggestive of infection, antibiotics not indicated at this time.  Discussed at-home management, follow up, and indications to return to the emergency department. As the patient does not currently have a primary doctor, referral was placed to establish with primary care for close follow up. See discharge summary.    At the conclusion of the encounter I discussed the results of all of the tests and the disposition. The questions were answered. The patient or family acknowledged understanding and was agreeable with the care plan.       ED COURSE:  8:05 PM I met and introduced myself to the patient. I gathered initial history and performed an initial physical exam. We discussed options and plan for diagnostics and treatment here in the ED.  9:36 PM I removed foreign body from plantar surface of patient's right heel. I discussed the plan for discharge with the patient, and patient is agreeable. We discussed supportive cares at home and reasons for return to the ER including new or worsening symptoms - all questions and concerns addressed to the best of my ability. Strict return precautions discussed. Patient to be discharged by RN.        History:  Supplemental history from: Documented in chart  External Record(s) reviewed: Documented in chart    Work Up:  Chart documentation includes differential considered and any EKGs or imaging independently interpreted by provider, where specified.  In additional to work up documented, I considered the following work up: Documented in chart, if  applicable.    External consultation:  Discussion of management with another provider: Documented in chart, if applicable    Complicating factors:  Care impacted by chronic illness: N/A  Care affected by social determinants of health: N/A    Disposition considerations: Discharge. No recommendations on prescription strength medication(s). N/A.        MEDICATIONS GIVEN IN THE EMERGENCY:  Medications   acetaminophen (TYLENOL) tablet 650 mg (650 mg Oral $Given 1/21/24 2129)   bacitracin ointment ( Topical $Given 1/21/24 2214)       NEW PRESCRIPTIONS STARTED AT TODAY'S ER VISIT  Discharge Medication List as of 1/21/2024 10:16 PM             =================================================================    HPI    Patient information was obtained from: Patient     Use of Intrepreter: N/A       Tyler Hughes is a 28 year old male with no pertinent medical history who presents to the emergency department for evaluation of right foot injury.    The patient reports that 20 minutes prior to arrival in the ED, he stepped on a toy motorcycle with his right foot while at home. He says that he heard a pop when it entered his foot, and that he and his wife tried to remove the toy motorcycle twice without success. He did not take any pain medications prior to arrival. Patient is not up-to-date on his tetanus shots and is unsure of his last dose. Denies fever, purulent drainage, or any other concerns at this time. Right foot sensation intact. Patient notes no history of medical diagnoses and says he does not take any medication on a regular basis.        PAST MEDICAL HISTORY:  Past Medical History:   Diagnosis Date    Attention deficit disorder     Created by Conversion  Replacement Utility updated for latest IMO load    Colon polyps     age 14 months, 2 polyps removed for rectal bleeding    Obesity, unspecified     Created by Conversion        PAST SURGICAL HISTORY:  Past Surgical History:   Procedure Laterality Date     TRANSAXILLARY RESECT FIRST RIB Left 6/28/2023    Procedure: LEFT TRANSAXILLARY FIRST RIB RESECTION AND SCALENECTOMY;  Surgeon: Stanley Kramer MD;  Location: [facility] OR    WISDOM TOOTH EXTRACTION         CURRENT MEDICATIONS:    Prior to Admission Medications   Prescriptions Last Dose Informant Patient Reported? Taking?   acetaminophen (TYLENOL) 500 MG tablet  Self Yes No   Sig: Take 1-2 tablets by mouth every 6 hours as needed for mild pain   ibuprofen (ADVIL/MOTRIN) 600 MG tablet   No No   Sig: Take 1 tablet (600 mg) by mouth 4 times daily      Facility-Administered Medications: None       ALLERGIES:  No Known Allergies    FAMILY HISTORY:  Family History   Problem Relation Age of Onset    Aneurysm Maternal Grandmother        SOCIAL HISTORY:  Social History     Tobacco Use    Smoking status: Former     Packs/day: .2     Types: Cigars, Cigarettes     Start date: 12/1/2020    Smokeless tobacco: Former    Tobacco comments:     E CIG   Substance Use Topics    Alcohol use: Not Currently     Comment: 1 beer/week    Drug use: No     Comment: Drug use: hx of marijuana, distant        VITALS:    First Vitals:  No data found.      No data found.        PHYSICAL EXAM  VITAL SIGNS: BP (!) 150/85   Pulse 98   Temp 97.9  F (36.6  C) (Oral)   Resp 16   Wt (!) 174.6 kg (385 lb)   SpO2 94%   BMI 48.12 kg/m     GENERAL: Awake, alert, answering questions appropriately.  HEENT: Moist mucous membranes. Posterior oropharynx clear with no erythema, no exudate. No tonsillar hypertrophy. Uvula midline. Tolerating secretions, no drooling.    SPEECH:  Easy to understand speech, Normal volume and steven. Normal phonation.  PULMONARY: No respiratory distress, Breathing comfortably on room air. Lungs clear to auscultation bilaterally.  CARDIOVASCULAR: Regular rate and rhythm, radial pulses present, symmetric, and normal.   ABDOMINAL: Soft, Nondistended, Nontender, No rebound or guarding, No palpable masses.  EXTREMITIES: Extremities  are warm and well perfused. No lower extremity edema. Motorcycle toy partially embedded in right plantar surface of heel, no active bleeding, no streaking erythema, no increased warmth, no purulent drainage, sensation and strength intact peripherally and distally. Capillary refill <2 seconds. No tenderness to palpation overlying bony structures of right foot.   NEUROLOGIC: Moving all extremities spontaneously.   SKIN: Exposed areas of skin warm, dry, no rashes.  PSYCH: Normal mood and affect.         RADIOLOGY/LAB:  Reviewed all pertinent imaging. Please see official radiology report.  All pertinent labs reviewed and interpreted.  Results for orders placed or performed during the hospital encounter of 01/21/24   Foot  XR, G/E 3 views, right    Impression    IMPRESSION: There is a metallic density projecting over the plantar aspect of the heel with a portion of which appears to reside within the subcutaneous soft tissues and a portion of which is outside of the soft tissues. This corresponds to the miniature   toy motorcycle which has punctured the heel and is stuck in the heel. There is no additional metallic foreign body or deeper involvement. No fracture. Joint spaces are maintained.         PROCEDURES:  PROCEDURE: Foreign Body Removal   INDICATIONS: Foreign Body   PROCEDURE PROVIDER: Magda Cleaning PA-C   SITE: Right plantar surface of heel   CONSENT: Risks, benefits and alternatives were discussed with and Verbal consent was obtained from Patient.   TIME OUT: Universal protocol was followed. TIME OUT conducted just prior to starting procedure confirmed patient identity, site/side, procedure, patient position, and availability of correct equipment. Yes   MEDICATION: 1 ml 2% lidocaine with epinephrine, SQ   DESCRIPTION OF PROCEDURE: I injected local anesthetic medication around the site of embedded foreign body. I used a Eileen clamp to grasp the portion of the motorcycle handle that was outside of his foot and  gently pulled with successful foreign body removal. I inspected the toy motorcycle and compared it to plain films to confirm it was removed intact in one piece. Patient NVI distally both before and after procedure.     COMPLICATIONS: Patient tolerated procedure well, without complication             I, Temi Dong, am serving as a scribe to document services personally performed by Magda Cleaning PA-C, based on my observation and the provider's statements to me. I, Magda Cleaning PA-C attest that Temi Dong is acting in a scribe capacity, has observed my performance of the services and has documented them in accordance with my direction.         Magda Cleaning PA-C  Emergency Medicine  [facility] EMERGENCY DEPARTMENT  [address] [place] [id number]  [phone]  Dept: [phone]     Magda Cleaning PA-C  01/29/24 7235

## 2024-09-22 ENCOUNTER — HEALTH MAINTENANCE LETTER (OUTPATIENT)
Age: 29
End: 2024-09-22

## 2025-03-06 ENCOUNTER — HOSPITAL ENCOUNTER (EMERGENCY)
Facility: HOSPITAL | Age: 30
Discharge: HOME | End: 2025-03-06
Attending: EMERGENCY MEDICINE
Payer: COMMERCIAL

## 2025-03-06 ENCOUNTER — TELEPHONE (OUTPATIENT)
Dept: NURSING | Facility: CLINIC | Age: 30
End: 2025-03-06

## 2025-03-06 VITALS
HEART RATE: 105 BPM | HEIGHT: 75 IN | TEMPERATURE: 98.2 F | BODY MASS INDEX: 39.17 KG/M2 | RESPIRATION RATE: 18 BRPM | SYSTOLIC BLOOD PRESSURE: 133 MMHG | DIASTOLIC BLOOD PRESSURE: 87 MMHG | OXYGEN SATURATION: 93 % | WEIGHT: 315 LBS

## 2025-03-06 DIAGNOSIS — K52.9 GASTROENTERITIS: ICD-10-CM

## 2025-03-06 LAB
ADV 40+41 DNA STL QL NAA+NON-PROBE: NEGATIVE
ALBUMIN SERPL BCG-MCNC: 4.6 G/DL (ref 3.5–5.2)
ALP SERPL-CCNC: 85 U/L (ref 40–150)
ALT SERPL W P-5'-P-CCNC: 60 U/L (ref 0–70)
ANION GAP SERPL CALCULATED.3IONS-SCNC: 12 MMOL/L (ref 7–15)
AST SERPL W P-5'-P-CCNC: 29 U/L (ref 0–45)
ASTRO TYP 1-8 RNA STL QL NAA+NON-PROBE: NEGATIVE
BILIRUB SERPL-MCNC: 0.3 MG/DL
BUN SERPL-MCNC: 14.8 MG/DL (ref 6–20)
C CAYETANENSIS DNA STL QL NAA+NON-PROBE: NEGATIVE
CALCIUM SERPL-MCNC: 10.1 MG/DL (ref 8.8–10.4)
CAMPYLOBACTER DNA SPEC NAA+PROBE: NEGATIVE
CHLORIDE SERPL-SCNC: 104 MMOL/L (ref 98–107)
CREAT SERPL-MCNC: 1.09 MG/DL (ref 0.67–1.17)
CRYPTOSP DNA STL QL NAA+NON-PROBE: NEGATIVE
E COLI O157 DNA STL QL NAA+NON-PROBE: ABNORMAL
E HISTOLYT DNA STL QL NAA+NON-PROBE: NEGATIVE
EAEC ASTA GENE ISLT QL NAA+PROBE: NEGATIVE
EC STX1+STX2 GENES STL QL NAA+NON-PROBE: NEGATIVE
EGFRCR SERPLBLD CKD-EPI 2021: >90 ML/MIN/1.73M2
EPEC EAE GENE STL QL NAA+NON-PROBE: NEGATIVE
ETEC LTA+ST1A+ST1B TOX ST NAA+NON-PROBE: NEGATIVE
G LAMBLIA DNA STL QL NAA+NON-PROBE: NEGATIVE
GLUCOSE SERPL-MCNC: 143 MG/DL (ref 70–99)
HCO3 SERPL-SCNC: 26 MMOL/L (ref 22–29)
HOLD SPECIMEN: NORMAL
HOLD SPECIMEN: NORMAL
LIPASE SERPL-CCNC: 18 U/L (ref 13–60)
NOROVIRUS GI+II RNA STL QL NAA+NON-PROBE: POSITIVE
P SHIGELLOIDES DNA STL QL NAA+NON-PROBE: NEGATIVE
POTASSIUM SERPL-SCNC: 4 MMOL/L (ref 3.4–5.3)
PROT SERPL-MCNC: 7.5 G/DL (ref 6.4–8.3)
RVA RNA STL QL NAA+NON-PROBE: NEGATIVE
SALMONELLA SP RPOD STL QL NAA+PROBE: NEGATIVE
SAPO I+II+IV+V RNA STL QL NAA+NON-PROBE: NEGATIVE
SHIGELLA SP+EIEC IPAH ST NAA+NON-PROBE: NEGATIVE
SODIUM SERPL-SCNC: 142 MMOL/L (ref 135–145)
V CHOLERAE DNA SPEC QL NAA+PROBE: NEGATIVE
VIBRIO DNA SPEC NAA+PROBE: NEGATIVE
Y ENTEROCOL DNA STL QL NAA+PROBE: NEGATIVE

## 2025-03-06 PROCEDURE — 250N000011 HC RX IP 250 OP 636: Performed by: EMERGENCY MEDICINE

## 2025-03-06 PROCEDURE — 96374 THER/PROPH/DIAG INJ IV PUSH: CPT

## 2025-03-06 PROCEDURE — 258N000003 HC RX IP 258 OP 636: Performed by: EMERGENCY MEDICINE

## 2025-03-06 PROCEDURE — 80053 COMPREHEN METABOLIC PANEL: CPT | Performed by: EMERGENCY MEDICINE

## 2025-03-06 PROCEDURE — 250N000013 HC RX MED GY IP 250 OP 250 PS 637: Performed by: EMERGENCY MEDICINE

## 2025-03-06 PROCEDURE — 99284 EMERGENCY DEPT VISIT MOD MDM: CPT | Mod: 25

## 2025-03-06 PROCEDURE — 96361 HYDRATE IV INFUSION ADD-ON: CPT

## 2025-03-06 PROCEDURE — 87507 IADNA-DNA/RNA PROBE TQ 12-25: CPT | Performed by: EMERGENCY MEDICINE

## 2025-03-06 PROCEDURE — 96376 TX/PRO/DX INJ SAME DRUG ADON: CPT

## 2025-03-06 PROCEDURE — 83690 ASSAY OF LIPASE: CPT | Performed by: EMERGENCY MEDICINE

## 2025-03-06 PROCEDURE — 96375 TX/PRO/DX INJ NEW DRUG ADDON: CPT

## 2025-03-06 PROCEDURE — 36415 COLL VENOUS BLD VENIPUNCTURE: CPT | Performed by: EMERGENCY MEDICINE

## 2025-03-06 RX ORDER — DIPHENHYDRAMINE HYDROCHLORIDE 50 MG/ML
25 INJECTION, SOLUTION INTRAMUSCULAR; INTRAVENOUS ONCE
Status: COMPLETED | OUTPATIENT
Start: 2025-03-06 | End: 2025-03-06

## 2025-03-06 RX ORDER — METOCLOPRAMIDE HYDROCHLORIDE 5 MG/ML
5 INJECTION INTRAMUSCULAR; INTRAVENOUS ONCE
Status: COMPLETED | OUTPATIENT
Start: 2025-03-06 | End: 2025-03-06

## 2025-03-06 RX ORDER — LOPERAMIDE HYDROCHLORIDE 2 MG/1
4 CAPSULE ORAL ONCE
Status: COMPLETED | OUTPATIENT
Start: 2025-03-06 | End: 2025-03-06

## 2025-03-06 RX ORDER — LORAZEPAM 2 MG/ML
0.5 INJECTION INTRAMUSCULAR ONCE
Status: COMPLETED | OUTPATIENT
Start: 2025-03-06 | End: 2025-03-06

## 2025-03-06 RX ORDER — METOCLOPRAMIDE HYDROCHLORIDE 5 MG/ML
10 INJECTION INTRAMUSCULAR; INTRAVENOUS ONCE
Status: COMPLETED | OUTPATIENT
Start: 2025-03-06 | End: 2025-03-06

## 2025-03-06 RX ORDER — DICYCLOMINE HCL 20 MG
20 TABLET ORAL ONCE
Status: DISCONTINUED | OUTPATIENT
Start: 2025-03-06 | End: 2025-03-06

## 2025-03-06 RX ORDER — ONDANSETRON 2 MG/ML
4 INJECTION INTRAMUSCULAR; INTRAVENOUS ONCE
Status: COMPLETED | OUTPATIENT
Start: 2025-03-06 | End: 2025-03-06

## 2025-03-06 RX ORDER — METOCLOPRAMIDE 10 MG/1
10 TABLET ORAL
Qty: 28 TABLET | Refills: 0 | Status: SHIPPED | OUTPATIENT
Start: 2025-03-06

## 2025-03-06 RX ADMIN — FAMOTIDINE 20 MG: 10 INJECTION, SOLUTION INTRAVENOUS at 01:32

## 2025-03-06 RX ADMIN — DIPHENHYDRAMINE HYDROCHLORIDE 25 MG: 50 INJECTION INTRAMUSCULAR; INTRAVENOUS at 01:31

## 2025-03-06 RX ADMIN — SODIUM CHLORIDE 1000 ML: 0.9 INJECTION, SOLUTION INTRAVENOUS at 03:18

## 2025-03-06 RX ADMIN — SODIUM CHLORIDE 1000 ML: 0.9 INJECTION, SOLUTION INTRAVENOUS at 01:31

## 2025-03-06 RX ADMIN — LOPERAMIDE HYDROCHLORIDE 4 MG: 2 CAPSULE ORAL at 02:05

## 2025-03-06 RX ADMIN — METOCLOPRAMIDE 10 MG: 5 INJECTION, SOLUTION INTRAMUSCULAR; INTRAVENOUS at 01:32

## 2025-03-06 RX ADMIN — ONDANSETRON 4 MG: 2 INJECTION, SOLUTION INTRAMUSCULAR; INTRAVENOUS at 03:18

## 2025-03-06 RX ADMIN — METOCLOPRAMIDE 5 MG: 5 INJECTION, SOLUTION INTRAMUSCULAR; INTRAVENOUS at 03:46

## 2025-03-06 RX ADMIN — LORAZEPAM 0.5 MG: 2 INJECTION INTRAMUSCULAR; INTRAVENOUS at 02:05

## 2025-03-06 ASSESSMENT — ACTIVITIES OF DAILY LIVING (ADL)
ADLS_ACUITY_SCORE: 50

## 2025-03-06 ASSESSMENT — COLUMBIA-SUICIDE SEVERITY RATING SCALE - C-SSRS
1. IN THE PAST MONTH, HAVE YOU WISHED YOU WERE DEAD OR WISHED YOU COULD GO TO SLEEP AND NOT WAKE UP?: NO
2. HAVE YOU ACTUALLY HAD ANY THOUGHTS OF KILLING YOURSELF IN THE PAST MONTH?: NO
6. HAVE YOU EVER DONE ANYTHING, STARTED TO DO ANYTHING, OR PREPARED TO DO ANYTHING TO END YOUR LIFE?: NO

## 2025-03-06 ASSESSMENT — ENCOUNTER SYMPTOMS
NAUSEA: 1
LIGHT-HEADEDNESS: 1
ABDOMINAL PAIN: 1
VOMITING: 1
DIARRHEA: 1

## 2025-03-06 NOTE — ED TRIAGE NOTES
"Pt c/o \"food poisoning\". Pt c/o N/V/D with severe abdominal pain since soon after eating at 6 pm last jh.        "

## 2025-03-06 NOTE — DISCHARGE INSTRUCTIONS
Continue using Zofran for any vomiting.  You can also use Reglan for any vomiting.  Can use Imodium for diarrhea.  I would expect you should be feeling better in the next 12 to 72 hours.  Will call you if anything strange grows out of your stool culture.  Return to ER for any worsening symptoms or recheck primary care doctor if symptoms are not improved

## 2025-03-06 NOTE — TELEPHONE ENCOUNTER
[facility]    Reason for call: Lab Result Notification     Lab Result (including Rx patient on, if applicable).  If culture, copy of lab report at bottom.  Lab Result:   Component      Latest Ref Rng 3/6/2025  1:55 AM   Norovirus Gl/Gll      Negative  Positive !       Legend:  ! Abnormal    Creatinine Level (mg/dl)   Creatinine   Date Value Ref Range Status   03/06/2025 1.09 0.67 - 1.17 mg/dL Final    Creatinine clearance (ml/min), if applicable    Serum creatinine: 1.09 mg/dL 03/06/25 0128  Estimated creatinine clearance: 173.4 mL/min     ED Symptoms: Presented to the ED with vomiting, abdominal pain, and diarrhea.     Current Symptoms: Feeling better, still with diarrhea, no vomiting.  He is pushing fluids.     RN Recommendations/Instructions per [place] ED lab result protocol:   [facility] ED lab result protocol utilized: Enteric bacteria (Norovirus)    Patient/care giver notified to contact your PCP clinic or return to the Emergency department if your:  Symptoms worsen or other concerning symptoms.       TRIXIE NORWOOD RN

## 2025-03-08 ENCOUNTER — NURSE TRIAGE (OUTPATIENT)
Dept: NURSING | Facility: CLINIC | Age: 30
End: 2025-03-08
Payer: COMMERCIAL

## 2025-03-08 ENCOUNTER — HOSPITAL ENCOUNTER (EMERGENCY)
Facility: HOSPITAL | Age: 30
Discharge: LEFT BEFORE DISCHARGE | End: 2025-03-08
Admitting: STUDENT IN AN ORGANIZED HEALTH CARE EDUCATION/TRAINING PROGRAM
Payer: COMMERCIAL

## 2025-03-08 VITALS
TEMPERATURE: 96.9 F | DIASTOLIC BLOOD PRESSURE: 88 MMHG | OXYGEN SATURATION: 95 % | SYSTOLIC BLOOD PRESSURE: 144 MMHG | BODY MASS INDEX: 39.17 KG/M2 | HEIGHT: 75 IN | WEIGHT: 315 LBS | RESPIRATION RATE: 20 BRPM | HEART RATE: 94 BPM

## 2025-03-08 PROCEDURE — 99281 EMR DPT VST MAYX REQ PHY/QHP: CPT

## 2025-03-08 ASSESSMENT — COLUMBIA-SUICIDE SEVERITY RATING SCALE - C-SSRS
2. HAVE YOU ACTUALLY HAD ANY THOUGHTS OF KILLING YOURSELF IN THE PAST MONTH?: NO
1. IN THE PAST MONTH, HAVE YOU WISHED YOU WERE DEAD OR WISHED YOU COULD GO TO SLEEP AND NOT WAKE UP?: NO
6. HAVE YOU EVER DONE ANYTHING, STARTED TO DO ANYTHING, OR PREPARED TO DO ANYTHING TO END YOUR LIFE?: NO

## 2025-03-08 NOTE — ED TRIAGE NOTES
"Patient reports that he was dx with Norovirus 2 days ago, pt continues to having N/V/D- unable to keep anything down, vomiting bile, uncontrolled diarrhea.  Also c/o \"feeling cold\", dizziness.        "

## 2025-03-08 NOTE — TELEPHONE ENCOUNTER
Seen for norovirus in ED 3/6/25.  Continues to vomit everything.  Cold, pale and clammy. Too weak to stand.  Advised 911.  Wife is home with him.  I advised that he lay down and elevate his legs and have wife call 911.  Caller stated understanding and agreement.      Reason for Disposition   Shock suspected (e.g., cold/pale/clammy skin, too weak to stand, low BP, rapid pulse)    Protocols used: Vomiting-A-GELY CHAPMAN RN [facility]

## (undated) DEVICE — DRAPE STOCKINETTE IMPERVIOUS 12" 1587

## (undated) DEVICE — DRSG DRAIN 4X4" 7086

## (undated) DEVICE — SOL NACL 0.9% IRRIG 1000ML BOTTLE 2F7124

## (undated) DEVICE — SU SILK 2-0 TIE 24" SA75H

## (undated) DEVICE — DRAPE LAP W/ARMBOARD 29410

## (undated) DEVICE — DRSG STERI STRIP 1/2X4" R1547

## (undated) DEVICE — PACK MAJOR SBA15MAFSI

## (undated) DEVICE — DRAPE IOBAN INCISE 23X17" 6650EZ

## (undated) DEVICE — BLADE KNIFE SURG 15 371115

## (undated) DEVICE — SOL WATER IRRIG 1000ML BOTTLE 2F7114

## (undated) DEVICE — SU MONOCRYL 4-0 PS-2 18" UND Y496G

## (undated) DEVICE — SU VICRYL 3-0 SH 27" J316H

## (undated) DEVICE — PREP CHLORAPREP 26ML TINTED HI-LITE ORANGE 930815

## (undated) DEVICE — DECANTER VIAL 2006S

## (undated) DEVICE — SU SILK 2-0 FSL 18" 677G

## (undated) DEVICE — GLOVE BIOGEL PI ULTRATOUCH SZ 7.5 41175

## (undated) DEVICE — LINEN TOWEL PACK X5 5464

## (undated) DEVICE — Device

## (undated) DEVICE — CLIP APPLIER 11" MED LIGACLIP MCM20

## (undated) DEVICE — ESU ELEC BLADE 6" COATED E1450-6

## (undated) DEVICE — DRAIN JACKSON PRATT CHANNEL 19FR ROUND HUBLESS SIL JP-2230

## (undated) DEVICE — ESU GROUND PAD UNIVERSAL W/O CORD

## (undated) DEVICE — DRAIN JACKSON PRATT RESERVOIR 100ML SU130-1305

## (undated) DEVICE — SU VICRYL 2-0 CT-1 27" J339H

## (undated) RX ORDER — PROPOFOL 10 MG/ML
INJECTION, EMULSION INTRAVENOUS
Status: DISPENSED
Start: 2023-06-28

## (undated) RX ORDER — HYDROMORPHONE HCL IN WATER/PF 6 MG/30 ML
PATIENT CONTROLLED ANALGESIA SYRINGE INTRAVENOUS
Status: DISPENSED
Start: 2023-06-28

## (undated) RX ORDER — FENTANYL CITRATE 0.05 MG/ML
INJECTION, SOLUTION INTRAMUSCULAR; INTRAVENOUS
Status: DISPENSED
Start: 2023-06-28

## (undated) RX ORDER — HYDROMORPHONE HYDROCHLORIDE 1 MG/ML
INJECTION, SOLUTION INTRAMUSCULAR; INTRAVENOUS; SUBCUTANEOUS
Status: DISPENSED
Start: 2023-06-28

## (undated) RX ORDER — ESMOLOL HYDROCHLORIDE 10 MG/ML
INJECTION INTRAVENOUS
Status: DISPENSED
Start: 2023-06-28

## (undated) RX ORDER — DEXAMETHASONE SODIUM PHOSPHATE 4 MG/ML
INJECTION, SOLUTION INTRA-ARTICULAR; INTRALESIONAL; INTRAMUSCULAR; INTRAVENOUS; SOFT TISSUE
Status: DISPENSED
Start: 2023-06-28

## (undated) RX ORDER — ONDANSETRON 2 MG/ML
INJECTION INTRAMUSCULAR; INTRAVENOUS
Status: DISPENSED
Start: 2023-06-28

## (undated) RX ORDER — FENTANYL CITRATE 50 UG/ML
INJECTION, SOLUTION INTRAMUSCULAR; INTRAVENOUS
Status: DISPENSED
Start: 2023-06-28

## (undated) RX ORDER — KETOROLAC TROMETHAMINE 30 MG/ML
INJECTION, SOLUTION INTRAMUSCULAR; INTRAVENOUS
Status: DISPENSED
Start: 2023-06-28